# Patient Record
Sex: FEMALE | Race: ASIAN | NOT HISPANIC OR LATINO
[De-identification: names, ages, dates, MRNs, and addresses within clinical notes are randomized per-mention and may not be internally consistent; named-entity substitution may affect disease eponyms.]

---

## 2017-02-08 ENCOUNTER — APPOINTMENT (OUTPATIENT)
Dept: PULMONOLOGY | Facility: CLINIC | Age: 82
End: 2017-02-08

## 2017-02-08 VITALS
RESPIRATION RATE: 17 BRPM | HEART RATE: 60 BPM | DIASTOLIC BLOOD PRESSURE: 80 MMHG | WEIGHT: 160 LBS | HEIGHT: 60 IN | BODY MASS INDEX: 31.41 KG/M2 | OXYGEN SATURATION: 97 % | SYSTOLIC BLOOD PRESSURE: 120 MMHG

## 2017-02-08 RX ORDER — DICLOFENAC SODIUM AND MISOPROSTOL 50; 200 MG/1; UG/1
50-0.2 TABLET, DELAYED RELEASE ORAL
Qty: 60 | Refills: 2 | Status: ACTIVE | COMMUNITY
Start: 2017-02-08 | End: 1900-01-01

## 2017-05-26 ENCOUNTER — MEDICATION RENEWAL (OUTPATIENT)
Age: 82
End: 2017-05-26

## 2017-06-08 ENCOUNTER — APPOINTMENT (OUTPATIENT)
Dept: PULMONOLOGY | Facility: CLINIC | Age: 82
End: 2017-06-08

## 2017-06-08 VITALS
RESPIRATION RATE: 17 BRPM | SYSTOLIC BLOOD PRESSURE: 124 MMHG | HEART RATE: 57 BPM | HEIGHT: 60 IN | OXYGEN SATURATION: 100 % | BODY MASS INDEX: 31.41 KG/M2 | WEIGHT: 160 LBS | DIASTOLIC BLOOD PRESSURE: 80 MMHG

## 2017-10-09 ENCOUNTER — APPOINTMENT (OUTPATIENT)
Dept: PULMONOLOGY | Facility: CLINIC | Age: 82
End: 2017-10-09

## 2017-10-25 ENCOUNTER — RX RENEWAL (OUTPATIENT)
Age: 82
End: 2017-10-25

## 2017-11-01 ENCOUNTER — APPOINTMENT (OUTPATIENT)
Dept: PULMONOLOGY | Facility: CLINIC | Age: 82
End: 2017-11-01
Payer: MEDICARE

## 2017-11-01 VITALS
SYSTOLIC BLOOD PRESSURE: 126 MMHG | DIASTOLIC BLOOD PRESSURE: 80 MMHG | HEIGHT: 60 IN | WEIGHT: 160 LBS | HEART RATE: 64 BPM | BODY MASS INDEX: 31.41 KG/M2 | OXYGEN SATURATION: 97 %

## 2017-11-01 PROCEDURE — 94010 BREATHING CAPACITY TEST: CPT

## 2017-11-01 PROCEDURE — 94729 DIFFUSING CAPACITY: CPT

## 2017-11-01 PROCEDURE — 99214 OFFICE O/P EST MOD 30 MIN: CPT | Mod: 25

## 2018-04-30 ENCOUNTER — MEDICATION RENEWAL (OUTPATIENT)
Age: 83
End: 2018-04-30

## 2018-06-05 ENCOUNTER — APPOINTMENT (OUTPATIENT)
Dept: PULMONOLOGY | Facility: CLINIC | Age: 83
End: 2018-06-05

## 2018-07-17 ENCOUNTER — APPOINTMENT (OUTPATIENT)
Dept: PULMONOLOGY | Facility: CLINIC | Age: 83
End: 2018-07-17

## 2018-07-25 ENCOUNTER — NON-APPOINTMENT (OUTPATIENT)
Age: 83
End: 2018-07-25

## 2018-07-25 ENCOUNTER — APPOINTMENT (OUTPATIENT)
Dept: PULMONOLOGY | Facility: CLINIC | Age: 83
End: 2018-07-25
Payer: MEDICARE

## 2018-07-25 VITALS
WEIGHT: 134 LBS | OXYGEN SATURATION: 96 % | HEART RATE: 80 BPM | BODY MASS INDEX: 26.31 KG/M2 | HEIGHT: 60 IN | DIASTOLIC BLOOD PRESSURE: 80 MMHG | SYSTOLIC BLOOD PRESSURE: 126 MMHG

## 2018-07-25 PROCEDURE — 99214 OFFICE O/P EST MOD 30 MIN: CPT | Mod: 25

## 2018-07-25 PROCEDURE — 94010 BREATHING CAPACITY TEST: CPT

## 2018-10-26 ENCOUNTER — RX RENEWAL (OUTPATIENT)
Age: 83
End: 2018-10-26

## 2019-02-05 ENCOUNTER — APPOINTMENT (OUTPATIENT)
Dept: PULMONOLOGY | Facility: CLINIC | Age: 84
End: 2019-02-05
Payer: MEDICARE

## 2019-02-05 VITALS
SYSTOLIC BLOOD PRESSURE: 117 MMHG | OXYGEN SATURATION: 98 % | WEIGHT: 140 LBS | DIASTOLIC BLOOD PRESSURE: 80 MMHG | HEIGHT: 60 IN | RESPIRATION RATE: 17 BRPM | HEART RATE: 65 BPM | BODY MASS INDEX: 27.48 KG/M2

## 2019-02-05 PROCEDURE — 99214 OFFICE O/P EST MOD 30 MIN: CPT

## 2019-02-05 NOTE — HISTORY OF PRESENT ILLNESS
[FreeTextEntry1] : Ms. Trejo is a 89 year old female with a history of asthma, ILDz, PAH and SOB presenting to the office today for a follow up visit. Her chief complaint is knee pain and need for med renewals.\par \par The patient states that she is in her normal state of health and is here for renewal of her Adempas. \par \par She reports that she has been doing well and has been trying to walk more but knee pain/arthritis flares up.  She admits to poor sleep- she states she is up every couple of hours to urinate and cannot get back to sleep. She states this has been going on as she has gotten older. She is uninterested in testing for sleep apnea.\par \par She otherwise states that her appetite is a bit decreased and associates that with age, She reports weight has been stable, denies fever, chills, SOB at rest and with exertion, wheezing, sore throat, swallowing problems, post nasal drip, sinus pressure, itchy or watery eyes, ear pain, chest pain or pressure, palpitations, dizziness, PND, orthopnea or lower extremity edema. She is on lasix. She did not take it today.  She denies any nausea, vomiting or abdominal pain.  She does not have sour taste in the mouth, reflux or heartburn.  She is urinating normally, denies headaches, rashes or new muscle cramps.\par \par She has never went for a HRCT scan that was ordered and recommended by Dr. Osei re: ILD.

## 2019-02-05 NOTE — REVIEW OF SYSTEMS
[As Noted in HPI] : as noted in HPI [Difficulty Maintaining Sleep] : difficulty maintaining sleep [Nonrestorative Sleep] : nonrestorative sleep [Negative] : Pulmonary Hypertension [Difficulty Initiating Sleep] : no difficulty falling asleep

## 2019-02-05 NOTE — PHYSICAL EXAM
[General Appearance - Well Developed] : well developed [Normal Appearance] : normal appearance [Well Groomed] : well groomed [General Appearance - Well Nourished] : well nourished [No Deformities] : no deformities [General Appearance - In No Acute Distress] : no acute distress [Normal Conjunctiva] : the conjunctiva exhibited no abnormalities [Eyelids - No Xanthelasma] : the eyelids demonstrated no xanthelasmas [Normal Oropharynx] : normal oropharynx [III] : III [Neck Appearance] : the appearance of the neck was normal [Neck Cervical Mass (___cm)] : no neck mass was observed [Jugular Venous Distention Increased] : there was no jugular-venous distention [Thyroid Diffuse Enlargement] : the thyroid was not enlarged [Thyroid Nodule] : there were no palpable thyroid nodules [Heart Rate And Rhythm] : heart rate and rhythm were normal [Heart Sounds] : normal S1 and S2 [Murmurs] : no murmurs present [Respiration, Rhythm And Depth] : normal respiratory rhythm and effort [Exaggerated Use Of Accessory Muscles For Inspiration] : no accessory muscle use [Kyphosis] : kyphosis [Abdomen Soft] : soft [Abdomen Tenderness] : non-tender [Abdomen Mass (___ Cm)] : no abdominal mass palpated [Gait - Sufficient For Exercise Testing] : the gait was sufficient for exercise testing [Nail Clubbing] : no clubbing of the fingernails [Cyanosis, Localized] : no localized cyanosis [Petechial Hemorrhages (___cm)] : no petechial hemorrhages [Skin Color & Pigmentation] : normal skin color and pigmentation [] : no rash [No Venous Stasis] : no venous stasis [Skin Lesions] : no skin lesions [No Skin Ulcers] : no skin ulcer [No Xanthoma] : no  xanthoma was observed [Deep Tendon Reflexes (DTR)] : deep tendon reflexes were 2+ and symmetric [Sensation] : the sensory exam was normal to light touch and pinprick [No Focal Deficits] : no focal deficits [Oriented To Time, Place, And Person] : oriented to person, place, and time [Impaired Insight] : insight and judgment were intact [Affect] : the affect was normal [FreeTextEntry1] : Walks with a walker and has a limp

## 2019-02-05 NOTE — REASON FOR VISIT
[Follow-Up] : a follow-up visit [ILD] : ILD [Pulmonary Hypertension] : pulmonary hypertension [Other: _____] : [unfilled]

## 2019-02-05 NOTE — ASSESSMENT
[FreeTextEntry1] : The plan for the patient is as follows:\par \par problem 1: PAH\par -continue Adempas 0.5 mg TID- renewed 90 day supply with 1 refill ; tolerating it well\par -yearly echo' s needed with Dr. Majano, last was done in 2018 per pt's aide- will get a copy sent over\par -regular LFTs-pt reports she gets this with her PCP- will ask for records\par -advised to maintain a low salt diet \par \par problem 2: knee pain and arthritis \par -recommended Copper Knee brace / Arnica Cream / Topricin Cream \par -recommended orthopedic follow up \par \par problem 3: GERD-stable/denies symptoms\par -off zantac\par \par problem 4: ? ILDz\par -HRCT never completed, pt formally refuses to completing study as she reports feeling "well" and due to age. \par \par problem 5: asthma?- denies SOB or related symptoms\par -not on therapy\par -refused inhalers in the past\par \par \par F/U in 6 months with Dr. Osei or myself\par will encourage PFTs again next visit as well as 6MWT\par She is encouraged to call with any changes, concerns, or questions.

## 2019-02-08 ENCOUNTER — RX RENEWAL (OUTPATIENT)
Age: 84
End: 2019-02-08

## 2020-06-08 ENCOUNTER — APPOINTMENT (OUTPATIENT)
Dept: PULMONOLOGY | Facility: CLINIC | Age: 85
End: 2020-06-08
Payer: MEDICARE

## 2020-06-08 PROCEDURE — 99215 OFFICE O/P EST HI 40 MIN: CPT | Mod: 95

## 2020-06-08 NOTE — ADDENDUM
[FreeTextEntry1] : Documented by Jeff Bro acting as a scribe for Dr. Gordon Osei on 06/08/2020.\par \par All medical record entries made by the Scribe were at my, Dr. Gordon Osei's, direction and personally dictated by me on 06/08/2020. I have reviewed the chart and agree that the record accurately reflects my personal performance of the history, physical exam, assessment and plan. I have also personally directed, reviewed, and agree with the discharge instructions.

## 2020-06-08 NOTE — HISTORY OF PRESENT ILLNESS
[Home] : at home, [unfilled] , at the time of the visit. [Medical Office: ([facility])___] : at the medical office located in  [Verbal consent obtained from patient] : the patient, [unfilled] [FreeTextEntry1] : Ms. Trejo is a 89 year old female with a history of asthma, ILDz, lung field abnormal finding, PAH and SOB video calling to the office today for a follow up visit. Her chief complaint is\par \par -she notes she is generally feeling well\par -She notes Her bowels are regular\par -she notes energy levels are good 8.5/10\par -she notes sleeping issues\par -she notes frequent waking from sleep after a couple of hours\par -she notes sleeping 6 to 7 hours of sleep on average\par -she denies ankle swelling\par -she notes appetite is stable\par -she denies wheeze\par -she notes vision is stable\par -she notes no new medications\par \par -she denies any chest pain, chest pressure, diarrhea, constipation, dysphagia, dizziness, sour taste in the mouth, leg swelling, leg pain, itchy eyes, itchy ears, heartburn, reflux, myalgias or arthralgias.

## 2020-06-08 NOTE — ASSESSMENT
[FreeTextEntry1] : Ms. Trejo is a 89 year old female with a history of Macular Degeneration, PAH, arthritis, gerd, HTN, asthma who is stable from a pulmonary perspective. She video calls into the office for a pulmonary follow up. -Stable\par \par Her shortness of breath is multifactorial due to:\par -overweight\par -PAH\par -poor breathing mechanics related to balance\par -? 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 Majano, needs repeat if not done from 3/2016\par -tolerating it well\par -regular LFTs \par -to maintain a low salt diet \par \par -Disorder of the pulmonary arteries, important to distinguish the difference between pulmonary arterial hypertension which is idiopathic to secondary pulmonary hypertension which is related to heart disease being diastolic dysfunction or congestive heart failure. Diagnostics include an initial echocardiogram evaluating the pulmonary artery pressures, if this is abnormal, to proceed with a VQ scan as well as a CTPA and an eventual right heart catheterization (No medication can be prescribed until the right heart catheterization). If present, the evaluation will include rheumatological blood testing, HIV testing, and potential evaluation for cirrhosis. Drug classes include PED5 (Revatio, Adcirca) ETRA (Tracleer, Macitentan, Letairis), Soluble guanylate cyclase (Adempas) Prostacyclins (Uptravi, Tyavso, Ventavis, Remodulin, or Orenitram derivatives).\par \par problem 2: knee pain and arthritis \par -recommended Copper Knee brace / Arnica Cream / Topricin Cream \par -recommended orthopedic follow up \par -recommended to follow up with Dr. Luis Julien\par \par problem 3: GERD\par -continue to use Zantac 300 mg before bed\par -Rule of 2s: avoid eating too much, eating too late, eating too spicy, eating two hours before bed\par -Things to avoid including overeating, spicy foods, tight clothing, eating within three hours of bed, this list is not all inclusive. \par -For treatment of reflux, possible options discussed including diet control, H2 blockers, PPIs, as well as coating motility agents discussed as treatment options. Timing of meals and proximity of last meal to sleep were discussed. If symptoms persist, a formal gastrointestinal evaluation is needed. \par \par problem 4: ? ILDz\par -she is being sent for a high resolution chest CT with inspiratory and expiratory phases, noncompliant prior (prescription given)( multiple times)\par \par -Etiology will depend on the causative agent possibilities include: idiopathic pulmonary fibrosis (UIP), NSIP (nonspecific interstitial pneumonia) , respiratory bronchiolitis in someone who is a smoker, drug induced lung disease, hypersensitivity pneumonitis, BOOP, sarcoidosis, chronic congestive heart failure,  rheumatologic disorder induced interstitial lung disease. Optimal diagnosing will include rheumatological panel which would include ESR, FARNAZ, ANCA, ARNP, CCP, rheumatoid factor, hypersensitivity panel, JOE1, scleroderma antibodies, sjogren's syndrome antibodies; biopsy will be necessary to definitively determine the etiology unless the CT scan findings are specific for UIP. Therapy will be based on diagnostics. \par \par problem 5: asthma\par -she was offered a bronchodilator but at this point in time the patient has refused\par \par -Asthma is  believed to be caused by inherited (genetic) and environmental factor, but its exact cause is unknown. Asthma may be triggered by allergens, lung infections, or irritants in the air. Asthma triggers are different for each person\par \par problem 6: Health Maintenance/COVID19 Precautions:\par - Clean your hands often. Wash your hands often with soap and water for at least 20 seconds, especially after blowing your nose, coughing, or sneezing, or having been in a public place.\par - If soap and water are not available, use a hand  that contains at least 60% alcohol.\par - To the extent possible, avoid touching high-touch surfaces in public places - elevator buttons, door handles, handrails, handshaking with people, etc. Use a tissue or your sleeve to cover your hand or finger if you must touch something.\par - Wash your hands after touching surfaces in public places.\par - Avoid touching your face, nose, eyes, etc.\par - Clean and disinfect your home to remove germs: practice routine cleaning of frequently touched surfaces (for example: tables, doorknobs, light switches, handles, desks, toilets, faucets, sinks & cell phones)\par - Avoid crowds, especially in poorly ventilated spaces. Your risk of exposure to respiratory viruses like COVID-19 may increase in crowded, closed-in settings with little air circulation if there are people in the crowd who are sick. All patients are recommended to practice social distancing and stay at least 6 feet away from others.\par - Avoid all non-essential travel including plane trips, and especially avoid embarking on cruise ships.\par -If COVID-19 is spreading in your community, take extra measures to put distance between yourself and other people to further reduce your risk of being exposed to this new virus.\par -Stay home as much as possible.\par - Consider ways of getting food brought to your house through family, social, or commercial networks\par -Be aware that the virus has been known to live in the air up to 3 hours post exposure, cardboard up to 24 hours post exposure, copper up to 4 hours post exposure, steel and plastic up to 2-3 days post exposure. Risk of transmission from these surfaces are affected by many variables.\par Immune Support Recommendations:\par -OTC Vitamin C 500mg BID \par -OTC Quercetin 250-500mg BID \par -OTC Zinc 75-100mg per day \par -OTC Melatonin 1 or 2 mg a night \par -OTC Vitamin D 1-4000mg per day \par -OTC Tonic Water 8oz per day\par Asthma and COVID19:\par You need to make sure your asthma is under control. This often requires the use of inhaled corticosteroids (and sometimes oral corticosteroids). Inhaled corticosteroids do not likely reduce your immune system’s ability to fight infections, but oral corticosteroids may. It is important to use the steps above to protect yourself to limit your exposure to any respiratory virus. \par \par problem 7: health maintenance \par -s/p flu shot\par -recommended strep pneumonia vaccines: Prevnar-13 vaccine, followed by Pneumo vaccine 23 one year following\par -recommended early intervention for URIs\par -recommended regular osteoporosis evaluations\par -recommended early dermatological evaluations\par -recommended after the age of 50 to the age of 70, colonoscopy every 5 years \par \par F/U in 4 months\par She is encouraged to call with any changes, concerns, or questions.

## 2020-06-08 NOTE — REASON FOR VISIT
[Follow-Up] : a follow-up visit [Family Member] : family member [FreeTextEntry1] : video call- asthma, ILDz, lung field abnormal finding, PAH and SOB

## 2020-06-08 NOTE — PHYSICAL EXAM
[No Acute Distress] : no acute distress [Well Nourished] : well nourished [Well Developed] : well developed [No Deformities] : no deformities

## 2020-07-20 ENCOUNTER — INPATIENT (INPATIENT)
Facility: HOSPITAL | Age: 85
LOS: 4 days | Discharge: HOME HEALTH | End: 2020-07-25
Attending: INTERNAL MEDICINE | Admitting: INTERNAL MEDICINE
Payer: MEDICARE

## 2020-07-20 VITALS
WEIGHT: 134.92 LBS | HEIGHT: 64 IN | HEART RATE: 99 BPM | OXYGEN SATURATION: 96 % | TEMPERATURE: 96 F | SYSTOLIC BLOOD PRESSURE: 114 MMHG | DIASTOLIC BLOOD PRESSURE: 76 MMHG | RESPIRATION RATE: 21 BRPM

## 2020-07-20 DIAGNOSIS — Z98.49 CATARACT EXTRACTION STATUS, UNSPECIFIED EYE: Chronic | ICD-10-CM

## 2020-07-20 LAB
ALBUMIN SERPL ELPH-MCNC: 2.9 G/DL — LOW (ref 3.3–5)
ALP SERPL-CCNC: 70 U/L — SIGNIFICANT CHANGE UP (ref 40–120)
ALT FLD-CCNC: 23 U/L — SIGNIFICANT CHANGE UP (ref 12–78)
ANION GAP SERPL CALC-SCNC: 8 MMOL/L — SIGNIFICANT CHANGE UP (ref 5–17)
APPEARANCE UR: CLEAR — SIGNIFICANT CHANGE UP
AST SERPL-CCNC: 34 U/L — SIGNIFICANT CHANGE UP (ref 15–37)
BACTERIA # UR AUTO: ABNORMAL
BASOPHILS # BLD AUTO: 0.02 K/UL — SIGNIFICANT CHANGE UP (ref 0–0.2)
BASOPHILS NFR BLD AUTO: 0.2 % — SIGNIFICANT CHANGE UP (ref 0–2)
BILIRUB SERPL-MCNC: 0.5 MG/DL — SIGNIFICANT CHANGE UP (ref 0.2–1.2)
BILIRUB UR-MCNC: NEGATIVE — SIGNIFICANT CHANGE UP
BUN SERPL-MCNC: 20 MG/DL — SIGNIFICANT CHANGE UP (ref 7–23)
CALCIUM SERPL-MCNC: 8.8 MG/DL — SIGNIFICANT CHANGE UP (ref 8.5–10.1)
CHLORIDE SERPL-SCNC: 98 MMOL/L — SIGNIFICANT CHANGE UP (ref 96–108)
CO2 SERPL-SCNC: 25 MMOL/L — SIGNIFICANT CHANGE UP (ref 22–31)
COLOR SPEC: YELLOW — SIGNIFICANT CHANGE UP
CREAT SERPL-MCNC: 0.77 MG/DL — SIGNIFICANT CHANGE UP (ref 0.5–1.3)
DIFF PNL FLD: ABNORMAL
EOSINOPHIL # BLD AUTO: 0.04 K/UL — SIGNIFICANT CHANGE UP (ref 0–0.5)
EOSINOPHIL NFR BLD AUTO: 0.5 % — SIGNIFICANT CHANGE UP (ref 0–6)
EPI CELLS # UR: ABNORMAL
GLUCOSE SERPL-MCNC: 104 MG/DL — HIGH (ref 70–99)
GLUCOSE UR QL: NEGATIVE MG/DL — SIGNIFICANT CHANGE UP
HCT VFR BLD CALC: 34 % — LOW (ref 34.5–45)
HGB BLD-MCNC: 11.5 G/DL — SIGNIFICANT CHANGE UP (ref 11.5–15.5)
HYALINE CASTS # UR AUTO: ABNORMAL /LPF
IMM GRANULOCYTES NFR BLD AUTO: 0.5 % — SIGNIFICANT CHANGE UP (ref 0–1.5)
KETONES UR-MCNC: NEGATIVE — SIGNIFICANT CHANGE UP
LEUKOCYTE ESTERASE UR-ACNC: ABNORMAL
LYMPHOCYTES # BLD AUTO: 1.59 K/UL — SIGNIFICANT CHANGE UP (ref 1–3.3)
LYMPHOCYTES # BLD AUTO: 19.9 % — SIGNIFICANT CHANGE UP (ref 13–44)
MCHC RBC-ENTMCNC: 31.5 PG — SIGNIFICANT CHANGE UP (ref 27–34)
MCHC RBC-ENTMCNC: 33.8 GM/DL — SIGNIFICANT CHANGE UP (ref 32–36)
MCV RBC AUTO: 93.2 FL — SIGNIFICANT CHANGE UP (ref 80–100)
MONOCYTES # BLD AUTO: 0.62 K/UL — SIGNIFICANT CHANGE UP (ref 0–0.9)
MONOCYTES NFR BLD AUTO: 7.7 % — SIGNIFICANT CHANGE UP (ref 2–14)
NEUTROPHILS # BLD AUTO: 5.7 K/UL — SIGNIFICANT CHANGE UP (ref 1.8–7.4)
NEUTROPHILS NFR BLD AUTO: 71.2 % — SIGNIFICANT CHANGE UP (ref 43–77)
NITRITE UR-MCNC: NEGATIVE — SIGNIFICANT CHANGE UP
NRBC # BLD: 0 /100 WBCS — SIGNIFICANT CHANGE UP (ref 0–0)
PH UR: 6 — SIGNIFICANT CHANGE UP (ref 5–8)
PLATELET # BLD AUTO: 320 K/UL — SIGNIFICANT CHANGE UP (ref 150–400)
POTASSIUM SERPL-MCNC: 3.8 MMOL/L — SIGNIFICANT CHANGE UP (ref 3.5–5.3)
POTASSIUM SERPL-SCNC: 3.8 MMOL/L — SIGNIFICANT CHANGE UP (ref 3.5–5.3)
PROT SERPL-MCNC: 8.3 GM/DL — SIGNIFICANT CHANGE UP (ref 6–8.3)
PROT UR-MCNC: NEGATIVE MG/DL — SIGNIFICANT CHANGE UP
RBC # BLD: 3.65 M/UL — LOW (ref 3.8–5.2)
RBC # FLD: 12.7 % — SIGNIFICANT CHANGE UP (ref 10.3–14.5)
RBC CASTS # UR COMP ASSIST: ABNORMAL /HPF (ref 0–4)
SODIUM SERPL-SCNC: 131 MMOL/L — LOW (ref 135–145)
SP GR SPEC: 1.01 — SIGNIFICANT CHANGE UP (ref 1.01–1.02)
UROBILINOGEN FLD QL: NEGATIVE MG/DL — SIGNIFICANT CHANGE UP
WBC # BLD: 8.01 K/UL — SIGNIFICANT CHANGE UP (ref 3.8–10.5)
WBC # FLD AUTO: 8.01 K/UL — SIGNIFICANT CHANGE UP (ref 3.8–10.5)
WBC UR QL: ABNORMAL

## 2020-07-20 PROCEDURE — 99285 EMERGENCY DEPT VISIT HI MDM: CPT | Mod: CS

## 2020-07-20 PROCEDURE — 72148 MRI LUMBAR SPINE W/O DYE: CPT | Mod: 26

## 2020-07-20 PROCEDURE — 72100 X-RAY EXAM L-S SPINE 2/3 VWS: CPT | Mod: 26

## 2020-07-20 PROCEDURE — 99223 1ST HOSP IP/OBS HIGH 75: CPT

## 2020-07-20 RX ORDER — DEXAMETHASONE 0.5 MG/5ML
8 ELIXIR ORAL EVERY 12 HOURS
Refills: 0 | Status: DISCONTINUED | OUTPATIENT
Start: 2020-07-20 | End: 2020-07-20

## 2020-07-20 RX ORDER — MORPHINE SULFATE 50 MG/1
4 CAPSULE, EXTENDED RELEASE ORAL ONCE
Refills: 0 | Status: DISCONTINUED | OUTPATIENT
Start: 2020-07-20 | End: 2020-07-20

## 2020-07-20 RX ADMIN — MORPHINE SULFATE 4 MILLIGRAM(S): 50 CAPSULE, EXTENDED RELEASE ORAL at 18:52

## 2020-07-20 NOTE — ED ADULT TRIAGE NOTE - CHIEF COMPLAINT QUOTE
hx of multiple lumber herniated disk C/O lower back pain worsen 3 days ago causing impaired gait, states Gabapentin 100mg not effective. Denies dysuria, fever , chills. LBM last week. daughter Basilia Trejo [phone] present. hx of multiple lumber herniated disk C/O lower back pain worsen 3 days ago causing impaired gait, states Gabapentin 100mg not effective. Denies dysuria, fever , chills. LBM last week. Daughter Basilia Trejo cell [phone]

## 2020-07-20 NOTE — ED ADULT NURSE NOTE - OBJECTIVE STATEMENT
Pt c/o of lower back pain x days. Denies any injury. MRI performed last week that shows herniated disc. reported daughter

## 2020-07-20 NOTE — ED PROVIDER NOTE - CLINICAL SUMMARY MEDICAL DECISION MAKING FREE TEXT BOX
Ddx: Concern for cauda equina given bladder and bowel incontinence/ sciatica  Plan: Cbc, cmp, pain control, MRI LS, reassess Ddx: Concern for cauda equina given bladder and bowel incontinence/ sciatica  Plan: Cbc, cmp, pain control, MRI LS, reassess    Dichter: MRI w/ acute L3 compression fx. Body of report w/ note made of cauda equina compression. D/w Ortho, favor to be chronic; however will eval pt in ED. D/w pt and w/ her 2 daughters, agree w/ plan to admit for pain control, Ortho eval. Pt will be admitted to medicine.

## 2020-07-20 NOTE — ED PROVIDER NOTE - PROGRESS NOTE DETAILS
Spoke w/ Ortho, think cauda equina in MRI report chronic, but will see pt in ED, req xray imaging lumbo sacral spine. Spoke w/ Ortho, think cauda equina in MRI report chronic, but will see pt in ED, request xray imaging lumbo sacral spine.

## 2020-07-20 NOTE — CONSULT NOTE ADULT - SUBJECTIVE AND OBJECTIVE BOX
Patient is a 89y Female who presents c/o lumbar back pain. pt was seen outpt and recommended by PCP to come in for eval due to increasing back pain for the past few weeks. she is brought in with her daughters. pt reports long standing hx of lumbar back pan. she uses walker at baseline to ambulate. describes symptoms of neurogenic claudication: pain with ambulation and mild weakness worse with long distances of ambulation that improve with sitting and rest. she denies any trauma or falls. Denies pain/injury elsewhere. Denies numbness/tingling/paresthesias. Denies bowel/bladder incontinence. Denies fevers/chills. No other complaints at this time.    ros: all other ros neg other than noted above    Home Medications:  amLODIPine 5 mg oral tablet: 1 tab(s) orally once a day (2016 09:38)  furosemide 40 mg oral tablet: 1 tab(s) orally once a day (2016 09:38)  levothyroxine 88 mcg (0.088 mg) oral tablet: 1 tab(s) orally once a day (2016 09:38)  lisinopril 40 mg oral tablet: 1 tab(s) orally once a day (2016 09:38)  Metoprolol Succinate  mg oral tablet, extended release: 1 tab(s) orally once a day (2016 09:38)      Allergies    No Known Allergies    Intolerances      PAST MEDICAL & SURGICAL HISTORY:  Swelling of right lower extremity: chronic right lower extremity swelling as per daughter  Blindness of right eye  Aortic sclerosis  Pulmonary hypertension  Cataract  Hypothyroidism  HTN (hypertension)  History of cataract surgery                            11.5   8.01  )-----------( 320      ( 2020 18:59 )             34.0       2020 18:59    131    |  98     |  20     ----------------------------<  104    3.8     |  25     |  0.77     Ca    8.8        2020 18:59    TPro  8.3    /  Alb  2.9    /  TBili  0.5    /  DBili  x      /  AST  34     /  ALT  23     /  AlkPhos  70     2020 18:59        Urinalysis Basic - ( 2020 20:10 )    Color: Yellow / Appearance: Clear / S.010 / pH: x  Gluc: x / Ketone: Negative  / Bili: Negative / Urobili: Negative mg/dL   Blood: x / Protein: Negative mg/dL / Nitrite: Negative   Leuk Esterase: Trace / RBC: 6-10 /HPF / WBC 6-10   Sq Epi: x / Non Sq Epi: Moderate / Bacteria: Few        Vital Signs Last 24 Hrs  T(C): 35.8 (20 @ 17:14), Max: 35.8 (20 @ 17:14)  T(F): 96.4 (20 @ 17:14), Max: 96.4 (20 @ 17:14)  HR: 99 (20 @ 17:14) (99 - 99)  BP: 114/76 (20 @ 17:14) (114/76 - 114/76)  BP(mean): --  RR: 21 (20 @ 17:14) (21 - 21)  SpO2: 96% (20 @ 17:14) (96% - 96%)    Gen: NAD      Spine PE:  Skin intact  No gross deformity  +ttp lumbar spine midline  No midnline TTP C/T/S spine  No bony step offs  No paraspinal muscle ttp/hypertonicity   Negative clonus  Negative babinski  Negative pena  pulses intact  no calf ttp bilateral  neg SLR bilaterally    Motor:            ElbowFlex    WristExt   ElbowExt   FingerFlex   FingerAbd     R            5/5                5/5            5/5             5/5               5/5  L            5/5                5/5            5/5             5/5               5/5                    C5                C6              C7               C8           T1   R            5/5                5/5            5/5             5/5               5/5  L             5/5                5/5            5/5             5/5               5/5          HipFlex   HipExt   KneeFlex   KneeExt   AnkleDorsi   AnklePlantar   HaluxDorsi   HaluxPlantar  R        5/5        5/5            5/5            4+/5             5/5                 5/5                    5/5                5/5  L         5/5        5/5            5/5            4+/5             5/5                 5/5                    5/5                5/5                L2             L3             L4               L5            S1  R         5/5                5/5            5/5             5/5               5/5  L           5/5                5/5            5/5             5/5               5/5    Sensory:            C5         C6         C7      C8       T1        (0=absent, 1=impaired, 2=normal, NT=not testable)  R         2            2           2        2          2  L          2            2           2        2          2               L2          L3         L4      L5       S1         (0=absent, 1=impaired, 2=normal, NT=not testable)  R       2            2           2        2          2  L         2            2           2        2          2      Imaging: MRI LSpx:      FINDINGS:     ALIGNMENT: Straightening of the expected lumbar lordosis with minimal   retrolisthesis of L3 on L4 and grade 1 anterolisthesis of L5 on S1. There is   a moderate levoconvex curvature to the lumbar spine.     VERTEBRAE: There is acute compression fracture of the L3 superior endplate   with associated bone marrow edema and loss of approximately to 30% vertebral   body height anteriorly. There is no evidence of posterior retropulsion or   associated canal stenosis.     Otherwise, the visualized vertebral bodies are normal in height.     There is no evidence of aggressive osseous lesion. Please note, there is   diffuse heterogeneity of the bone marrow, findings favored to represent bony   demineralization.     DISCS: Toe degenerative loss of intervertebral disc space height.     CONUS MEDULLARIS AND CAUDA EQUINA: The conus medullaris terminates at L1-2.   There is normal appearance of the conus medullaris and cauda equina nerve   roots.     PARAVERTEBRAL SOFT TISSUES AND VISUALIZED RETROPERITONEUM: There is advanced   atrophy of the posterior paravertebral and paraspinal musculature, likely   related to disuse.     EVALUATION OF INDIVIDUAL LEVELS:   L1-2: Degenerative changes of the facet joints resulting in mild right and   mild-to-moderate left neuroforaminal narrowing. No canal stenosis.     L2-3: Disc bulge and degenerative changes of the facet joints, thickening of   the ligamentum flavum resulting in mild canal stenosis and severe right,   moderate to severe left neuroforaminal narrowing.     L3-4: Minimal retrolisthesis of L3 on L4. Disc bulge and change changes of   the facet joints, thickening of ligamentum flavum resulting in moderate to   severe canal stenosis with mass effect on the descending cauda equina nerve   roots (series 5, image 10). There is also severe right, moderate to severe   left neuroforaminal narrowing.     L4-5: Bulge and degenerative changes of the facet joints, thickening of the   ligamentum flavum resulting in moderate canal stenosis and severe   neuroforaminal narrowing bilaterally, left greater than right.     L5-S1: Minimal grade 1 anterolisthesis of L5 on S1. Minimal disc bulge and   degenerative changes of the facet joints, thickening of ligamentum flavum   resulting in mild canal stenosis and moderate left, mild to moderate right   neuroforaminal narrowing.     LIMITED EVALUATION OF UPPER SACRUM AND SACROILIAC JOINTS: Mild to moderate   degenerative changes of the sacroiliac joints.     IMPRESSION:     Acute compression fracture L3 superior endplate resulting in loss of   approximately 30% vertebral body height anteriorly, without retropulsion or   canal stenosis.       XR LSPx pending    A/P: 89y Female with Lumbar stenosis and acute L3 vcf    based on history and exam pt has lumbar stenosis with signs of neurogenic claudication and with evidence of L3 vcf  low suspicion for cauda equina syndrome  recommend conservative mgmt at this time  discussed with pt possible need for surgical intervention in the future if worsening symptoms and failed course of conservative mgmt  medical admit for pain control and PT jasmyn recinos appreciated  LSO for pain mgmt  iv steroids for inflammation  Pain control  WBAT with assistive devices as needed  FU LSpx  imaging reviewed  SCDs and ambulation for dvt ppx  will discuss with attending  and advise if plan changes Patient is a 89y Female who presents c/o lumbar back pain. pt was seen outpt and recommended by PCP to come in for eval due to increasing back pain for the past few weeks. she is brought in with her daughters. pt reports long standing hx of lumbar back pan. she uses walker at baseline to ambulate. describes symptoms of neurogenic claudication: pain with ambulation and mild weakness worse with long distances of ambulation that improve with sitting and rest. she denies any trauma or falls. Denies pain/injury elsewhere. Denies numbness/tingling/paresthesias. Denies bowel/bladder incontinence. Denies fevers/chills. No other complaints at this time.    ros: all other ros neg other than noted above    Home Medications:  amLODIPine 5 mg oral tablet: 1 tab(s) orally once a day (2016 09:38)  furosemide 40 mg oral tablet: 1 tab(s) orally once a day (2016 09:38)  levothyroxine 88 mcg (0.088 mg) oral tablet: 1 tab(s) orally once a day (2016 09:38)  lisinopril 40 mg oral tablet: 1 tab(s) orally once a day (2016 09:38)  Metoprolol Succinate  mg oral tablet, extended release: 1 tab(s) orally once a day (2016 09:38)      Allergies    No Known Allergies    Intolerances      PAST MEDICAL & SURGICAL HISTORY:  Swelling of right lower extremity: chronic right lower extremity swelling as per daughter  Blindness of right eye  Aortic sclerosis  Pulmonary hypertension  Cataract  Hypothyroidism  HTN (hypertension)  History of cataract surgery                            11.5   8.01  )-----------( 320      ( 2020 18:59 )             34.0       2020 18:59    131    |  98     |  20     ----------------------------<  104    3.8     |  25     |  0.77     Ca    8.8        2020 18:59    TPro  8.3    /  Alb  2.9    /  TBili  0.5    /  DBili  x      /  AST  34     /  ALT  23     /  AlkPhos  70     2020 18:59        Urinalysis Basic - ( 2020 20:10 )    Color: Yellow / Appearance: Clear / S.010 / pH: x  Gluc: x / Ketone: Negative  / Bili: Negative / Urobili: Negative mg/dL   Blood: x / Protein: Negative mg/dL / Nitrite: Negative   Leuk Esterase: Trace / RBC: 6-10 /HPF / WBC 6-10   Sq Epi: x / Non Sq Epi: Moderate / Bacteria: Few        Vital Signs Last 24 Hrs  T(C): 35.8 (20 @ 17:14), Max: 35.8 (20 @ 17:14)  T(F): 96.4 (20 @ 17:14), Max: 96.4 (20 @ 17:14)  HR: 99 (20 @ 17:14) (99 - 99)  BP: 114/76 (20 @ 17:14) (114/76 - 114/76)  BP(mean): --  RR: 21 (20 @ 17:14) (21 - 21)  SpO2: 96% (20 @ 17:14) (96% - 96%)    Gen: NAD      Spine PE:  Skin intact  No gross deformity  +ttp lumbar spine midline  No midnline TTP C/T/S spine  No bony step offs  No paraspinal muscle ttp/hypertonicity   Negative clonus  Negative babinski  Negative pena  pulses intact  no calf ttp bilateral  neg SLR bilaterally    Motor:            ElbowFlex    WristExt   ElbowExt   FingerFlex   FingerAbd     R            5/5                5/5            5/5             5/5               5/5  L            5/5                5/5            5/5             5/5               5/5                    C5                C6              C7               C8           T1   R            5/5                5/5            5/5             5/5               5/5  L             5/5                5/5            5/5             5/5               5/5          HipFlex   HipExt   KneeFlex   KneeExt   AnkleDorsi   AnklePlantar   HaluxDorsi   HaluxPlantar  R        5/5        5/5            5/5            4+/5             5/5                 5/5                    5/5                5/5  L         5/5        5/5            5/5            4+/5             5/5                 5/5                    5/5                5/5                L2             L3             L4               L5            S1  R         5/5                5/5            5/5             5/5               5/5  L           5/5                5/5            5/5             5/5               5/5    Sensory:            C5         C6         C7      C8       T1        (0=absent, 1=impaired, 2=normal, NT=not testable)  R         2            2           2        2          2  L          2            2           2        2          2               L2          L3         L4      L5       S1         (0=absent, 1=impaired, 2=normal, NT=not testable)  R       2            2           2        2          2  L         2            2           2        2          2      Imaging: MRI LSpx:      FINDINGS:     ALIGNMENT: Straightening of the expected lumbar lordosis with minimal   retrolisthesis of L3 on L4 and grade 1 anterolisthesis of L5 on S1. There is   a moderate levoconvex curvature to the lumbar spine.     VERTEBRAE: There is acute compression fracture of the L3 superior endplate   with associated bone marrow edema and loss of approximately to 30% vertebral   body height anteriorly. There is no evidence of posterior retropulsion or   associated canal stenosis.     Otherwise, the visualized vertebral bodies are normal in height.     There is no evidence of aggressive osseous lesion. Please note, there is   diffuse heterogeneity of the bone marrow, findings favored to represent bony   demineralization.     DISCS: Toe degenerative loss of intervertebral disc space height.     CONUS MEDULLARIS AND CAUDA EQUINA: The conus medullaris terminates at L1-2.   There is normal appearance of the conus medullaris and cauda equina nerve   roots.     PARAVERTEBRAL SOFT TISSUES AND VISUALIZED RETROPERITONEUM: There is advanced   atrophy of the posterior paravertebral and paraspinal musculature, likely   related to disuse.     EVALUATION OF INDIVIDUAL LEVELS:   L1-2: Degenerative changes of the facet joints resulting in mild right and   mild-to-moderate left neuroforaminal narrowing. No canal stenosis.     L2-3: Disc bulge and degenerative changes of the facet joints, thickening of   the ligamentum flavum resulting in mild canal stenosis and severe right,   moderate to severe left neuroforaminal narrowing.     L3-4: Minimal retrolisthesis of L3 on L4. Disc bulge and change changes of   the facet joints, thickening of ligamentum flavum resulting in moderate to   severe canal stenosis with mass effect on the descending cauda equina nerve   roots (series 5, image 10). There is also severe right, moderate to severe   left neuroforaminal narrowing.     L4-5: Bulge and degenerative changes of the facet joints, thickening of the   ligamentum flavum resulting in moderate canal stenosis and severe   neuroforaminal narrowing bilaterally, left greater than right.     L5-S1: Minimal grade 1 anterolisthesis of L5 on S1. Minimal disc bulge and   degenerative changes of the facet joints, thickening of ligamentum flavum   resulting in mild canal stenosis and moderate left, mild to moderate right   neuroforaminal narrowing.     LIMITED EVALUATION OF UPPER SACRUM AND SACROILIAC JOINTS: Mild to moderate   degenerative changes of the sacroiliac joints.     IMPRESSION:     Acute compression fracture L3 superior endplate resulting in loss of   approximately 30% vertebral body height anteriorly, without retropulsion or   canal stenosis.       XR LSPx pending    A/P: 89y Female with Lumbar stenosis and acute L3 vcf    based on history and exam pt has lumbar stenosis with signs of neurogenic claudication and with evidence of L3 vcf  low suspicion for cauda equina syndrome  recommend conservative mgmt at this time  discussed with pt possible need for surgical intervention in the future if worsening symptoms and failed course of conservative mgmt  medical admit for pain control and PT jasmyn recinos appreciated  LSO for pain mgmt  Pain control  WBAT with assistive devices as needed  FU LSpx  imaging reviewed  SCDs and ambulation for dvt ppx  will discuss with attending  and advise if plan changes

## 2020-07-20 NOTE — ED ADULT NURSE NOTE - CHIEF COMPLAINT QUOTE
hx of multiple lumber herniated disk C/O lower back pain worsen 3 days ago causing impaired gait, states Gabapentin 100mg not effective. Denies dysuria, fever , chills. LBM last week. Daughter Basilia Trejo cell [phone]

## 2020-07-20 NOTE — CONSULT NOTE ADULT - SUBJECTIVE AND OBJECTIVE BOX
REASON FOR CONSULT: telehospitalist evaluation    Outpatient physicians: Dr. Deepak Herman         HPI:   89 y.o. F with hx of blind in the R eye, [place], arthritis, chronic RLE swelling, HTN, hypothyroidism, lumbar disc herniation, baseline ambulates with a walker, currently presents to the ED with worsening lower back pain. The pain began about 2 weeks ago and progressively worsened in the last 5 days. Pt had an outpt MRI this past wed, showing herniated discs. She was started on gabapentin and continued with using tylenol prn and was given referred to see orthopedic surgery. Pt continued to have pain so she came to the ER. Pt reports the pain starts at the waist and radiates bilaterally down both legs and to the lower back.  Denies numbness/ tingling/urinary/ bowel incontinence. Difficult time getting out of bed and inability to ambulate. At baseline the pt ambulates with a walker. Patient denies chest pain, SOB, abd pain, N/V, fever, chills, dysuria or any other complaints. All remainder ROS negative. Minimal history obtained from the patient, daughter: Basilia provided history via phone.     PMH:   Aortic sclerosis    Blindness of right eye    Cataract    HTN (hypertension)    Hypothyroidism    Pulmonary hypertension    Swelling of right lower extremity  chronic right lower extremity swelling   Arthritis     PSH:   History of cataract surgery    Social Hx:  Denies smoking, alcohol or drug use  Ambulatory at baseline with walker     Family Hx:   None       Meds:   lisinopril 40 mg oral tablet: 1 tab(s) orally once a day  levothyroxine 88 mcg (0.088 mg) oral tablet: 1 tab(s) orally once a day  furosemide 40 mg oral tablet: 1 tab(s) orally once a day  Metoprolol Succinate  mg oral tablet, extended release: 1 tab(s) orally once a day  Gbaapentin 100mg po tid     Med rec completed     Allergies: NKDA       OBJECTIVE  Vital Signs Last 24 Hrs  T(C): 35.8 (2020 17:14), Max: 35.8 (2020 17:14)  T(F): 96.4 (2020 17:14), Max: 96.4 (2020 17:14)  HR: 99 (2020 17:14) (99 - 99)  BP: 114/76 (2020 17:14) (114/76 - 114/76)  BP(mean): --  RR: 21 (2020 17:14) (21 - 21)  SpO2: 96% (2020 17:14) (96% - 96%)      PHYSICAL EXAM: Tele-evaluation precludes physical exam.       LABS AND IMAGING DATA:                        11.5   8.01  )-----------( 320      ( 2020 18:59 )             34.0     07-20    131<L>  |  98  |  20  ----------------------------<  104<H>  3.8   |  25  |  0.77    Ca    8.8      2020 18:59    TPro  8.3  /  Alb  2.9<L>  /  TBili  0.5  /  DBili  x   /  AST  34  /  ALT  23  /  AlkPhos  70  07-20    Urinalysis Basic - ( 2020 20:10 )    Color: Yellow / Appearance: Clear / S.010 / pH: x  Gluc: x / Ketone: Negative  / Bili: Negative / Urobili: Negative mg/dL   Blood: x / Protein: Negative mg/dL / Nitrite: Negative   Leuk Esterase: Trace / RBC: 6-10 /HPF / WBC 6-10   Sq Epi: x / Non Sq Epi: Moderate / Bacteria: Few      ASSESSMENT AND PLAN:     89 y.o. F with hx of HTN, hypothyroidism, lumbar disc herniation, baseline ambulates with a walker, currently presents to the ED with intractable lower back pain with associated urinary incontinence and inability to ambulate, noted on imaging with lumbar stenosis and L3 vertebral compression fracture, low suspicion for cauda equina syndrome.    Admit to any medicine bed     Intractable back pain 2/2 lumbar stenosis and L3 vertebral compression fracture, low suspicion for cauda equina syndrome  - inability to ambulate due to pain   -MRI lumbar spine reviewed   -f/u lumbar/sacral spine x ray   -c/w LSO brace per ortho   -will c/w decadron 4mg IV q8hrs x 24-48hrs until official recommendations by ortho given   -c/w pain control   -c/w tylenol 975mg po q8hrs x 48 hrs routine then can make it prn   -c/w tramadol 25mg po q8hrs for moderate pain   -c/w tramadol 50mg po q8hrs for severe pain   -c/w gabapentin po tid   -WBAT with assistive devices as needed  -ortho consult noted and appreciated     HTN   -bp stable  -c/w lisinopril 40 mg po qd   -c/w furosemide 40 mg po qd   -c/w Metoprolol Succinate  mg po qd     Hypothyroidism   -c/w levothyroxine 88 mcg po qd     DVT ppx  -c/w lovenox 40mg sq qd     Activity level: Ambulate with assist/walker     Next of kin: Daughter - Basilia - called and discussed with her in regards to the history/ current plan of care. Updated in regards to the care and all questions answered.     Dispo: PT consulted, pending pain control and PT evaluation.         Care plan discussed with Dr. Beauchamp

## 2020-07-20 NOTE — ED ADULT NURSE NOTE - PMH
Aortic sclerosis    Blindness of right eye    Cataract    HTN (hypertension)    Hypothyroidism    Pulmonary hypertension    Swelling of right lower extremity  chronic right lower extremity swelling as per daughter

## 2020-07-20 NOTE — ED PROVIDER NOTE - OBJECTIVE STATEMENT
Pt is a 89 yo lady with a pmhx of HTN, hypothyroidism, lumbar disc herniation who presents to the ED with low back pain. It started 2 wks ago, worse over the past 5 days. Had MRI Wednesday, showing herniated discs. Pt was started on Gabapentin, and referred to surgery. Pt still having pain so came to ED. Pt has pain at waist and radiates bilaterally down legs. No numbness or tingling, but has had urinary incontinence, as well as no bm for 2 wks per family. Pt says sometimes is constipated.

## 2020-07-21 DIAGNOSIS — M54.42 LUMBAGO WITH SCIATICA, LEFT SIDE: ICD-10-CM

## 2020-07-21 DIAGNOSIS — I10 ESSENTIAL (PRIMARY) HYPERTENSION: ICD-10-CM

## 2020-07-21 DIAGNOSIS — N39.0 URINARY TRACT INFECTION, SITE NOT SPECIFIED: ICD-10-CM

## 2020-07-21 DIAGNOSIS — E03.9 HYPOTHYROIDISM, UNSPECIFIED: ICD-10-CM

## 2020-07-21 LAB
ANION GAP SERPL CALC-SCNC: 5 MMOL/L — SIGNIFICANT CHANGE UP (ref 5–17)
BASOPHILS # BLD AUTO: 0.02 K/UL — SIGNIFICANT CHANGE UP (ref 0–0.2)
BASOPHILS NFR BLD AUTO: 0.3 % — SIGNIFICANT CHANGE UP (ref 0–2)
BUN SERPL-MCNC: 14 MG/DL — SIGNIFICANT CHANGE UP (ref 7–23)
CALCIUM SERPL-MCNC: 8.8 MG/DL — SIGNIFICANT CHANGE UP (ref 8.5–10.1)
CHLORIDE SERPL-SCNC: 98 MMOL/L — SIGNIFICANT CHANGE UP (ref 96–108)
CO2 SERPL-SCNC: 30 MMOL/L — SIGNIFICANT CHANGE UP (ref 22–31)
CREAT SERPL-MCNC: 0.65 MG/DL — SIGNIFICANT CHANGE UP (ref 0.5–1.3)
EOSINOPHIL # BLD AUTO: 0.09 K/UL — SIGNIFICANT CHANGE UP (ref 0–0.5)
EOSINOPHIL NFR BLD AUTO: 1.3 % — SIGNIFICANT CHANGE UP (ref 0–6)
GLUCOSE SERPL-MCNC: 105 MG/DL — HIGH (ref 70–99)
HCT VFR BLD CALC: 35 % — SIGNIFICANT CHANGE UP (ref 34.5–45)
HGB BLD-MCNC: 11.5 G/DL — SIGNIFICANT CHANGE UP (ref 11.5–15.5)
IMM GRANULOCYTES NFR BLD AUTO: 0.3 % — SIGNIFICANT CHANGE UP (ref 0–1.5)
LYMPHOCYTES # BLD AUTO: 1.44 K/UL — SIGNIFICANT CHANGE UP (ref 1–3.3)
LYMPHOCYTES # BLD AUTO: 20.1 % — SIGNIFICANT CHANGE UP (ref 13–44)
MCHC RBC-ENTMCNC: 31.3 PG — SIGNIFICANT CHANGE UP (ref 27–34)
MCHC RBC-ENTMCNC: 32.9 GM/DL — SIGNIFICANT CHANGE UP (ref 32–36)
MCV RBC AUTO: 95.4 FL — SIGNIFICANT CHANGE UP (ref 80–100)
MONOCYTES # BLD AUTO: 0.61 K/UL — SIGNIFICANT CHANGE UP (ref 0–0.9)
MONOCYTES NFR BLD AUTO: 8.5 % — SIGNIFICANT CHANGE UP (ref 2–14)
NEUTROPHILS # BLD AUTO: 5 K/UL — SIGNIFICANT CHANGE UP (ref 1.8–7.4)
NEUTROPHILS NFR BLD AUTO: 69.5 % — SIGNIFICANT CHANGE UP (ref 43–77)
NRBC # BLD: 0 /100 WBCS — SIGNIFICANT CHANGE UP (ref 0–0)
PLATELET # BLD AUTO: 351 K/UL — SIGNIFICANT CHANGE UP (ref 150–400)
POTASSIUM SERPL-MCNC: 3.4 MMOL/L — LOW (ref 3.5–5.3)
POTASSIUM SERPL-SCNC: 3.4 MMOL/L — LOW (ref 3.5–5.3)
RBC # BLD: 3.67 M/UL — LOW (ref 3.8–5.2)
RBC # FLD: 12.7 % — SIGNIFICANT CHANGE UP (ref 10.3–14.5)
SARS-COV-2 IGG SERPL QL IA: NEGATIVE — SIGNIFICANT CHANGE UP
SARS-COV-2 IGM SERPL IA-ACNC: <3.8 AU/ML — SIGNIFICANT CHANGE UP
SARS-COV-2 RNA SPEC QL NAA+PROBE: SIGNIFICANT CHANGE UP
SODIUM SERPL-SCNC: 133 MMOL/L — LOW (ref 135–145)
WBC # BLD: 7.18 K/UL — SIGNIFICANT CHANGE UP (ref 3.8–10.5)
WBC # FLD AUTO: 7.18 K/UL — SIGNIFICANT CHANGE UP (ref 3.8–10.5)

## 2020-07-21 PROCEDURE — 12345: CPT | Mod: NC

## 2020-07-21 RX ORDER — LISINOPRIL 2.5 MG/1
40 TABLET ORAL DAILY
Refills: 0 | Status: DISCONTINUED | OUTPATIENT
Start: 2020-07-21 | End: 2020-07-25

## 2020-07-21 RX ORDER — SENNA PLUS 8.6 MG/1
2 TABLET ORAL AT BEDTIME
Refills: 0 | Status: DISCONTINUED | OUTPATIENT
Start: 2020-07-21 | End: 2020-07-25

## 2020-07-21 RX ORDER — FUROSEMIDE 40 MG
40 TABLET ORAL DAILY
Refills: 0 | Status: DISCONTINUED | OUTPATIENT
Start: 2020-07-21 | End: 2020-07-25

## 2020-07-21 RX ORDER — LEVOTHYROXINE SODIUM 125 MCG
88 TABLET ORAL DAILY
Refills: 0 | Status: DISCONTINUED | OUTPATIENT
Start: 2020-07-21 | End: 2020-07-25

## 2020-07-21 RX ORDER — DEXAMETHASONE 0.5 MG/5ML
4 ELIXIR ORAL EVERY 8 HOURS
Refills: 0 | Status: DISCONTINUED | OUTPATIENT
Start: 2020-07-21 | End: 2020-07-21

## 2020-07-21 RX ORDER — ACETAMINOPHEN 500 MG
975 TABLET ORAL EVERY 8 HOURS
Refills: 0 | Status: COMPLETED | OUTPATIENT
Start: 2020-07-21 | End: 2020-07-23

## 2020-07-21 RX ORDER — CEFTRIAXONE 500 MG/1
1000 INJECTION, POWDER, FOR SOLUTION INTRAMUSCULAR; INTRAVENOUS EVERY 24 HOURS
Refills: 0 | Status: DISCONTINUED | OUTPATIENT
Start: 2020-07-22 | End: 2020-07-22

## 2020-07-21 RX ORDER — CEFTRIAXONE 500 MG/1
INJECTION, POWDER, FOR SOLUTION INTRAMUSCULAR; INTRAVENOUS
Refills: 0 | Status: DISCONTINUED | OUTPATIENT
Start: 2020-07-21 | End: 2020-07-22

## 2020-07-21 RX ORDER — LACTULOSE 10 G/15ML
10 SOLUTION ORAL EVERY 12 HOURS
Refills: 0 | Status: DISCONTINUED | OUTPATIENT
Start: 2020-07-21 | End: 2020-07-25

## 2020-07-21 RX ORDER — ENOXAPARIN SODIUM 100 MG/ML
40 INJECTION SUBCUTANEOUS DAILY
Refills: 0 | Status: DISCONTINUED | OUTPATIENT
Start: 2020-07-21 | End: 2020-07-25

## 2020-07-21 RX ORDER — METOPROLOL TARTRATE 50 MG
200 TABLET ORAL DAILY
Refills: 0 | Status: DISCONTINUED | OUTPATIENT
Start: 2020-07-21 | End: 2020-07-25

## 2020-07-21 RX ORDER — CEFTRIAXONE 500 MG/1
1000 INJECTION, POWDER, FOR SOLUTION INTRAMUSCULAR; INTRAVENOUS ONCE
Refills: 0 | Status: COMPLETED | OUTPATIENT
Start: 2020-07-21 | End: 2020-07-21

## 2020-07-21 RX ORDER — GABAPENTIN 400 MG/1
100 CAPSULE ORAL THREE TIMES A DAY
Refills: 0 | Status: DISCONTINUED | OUTPATIENT
Start: 2020-07-21 | End: 2020-07-25

## 2020-07-21 RX ORDER — TRAMADOL HYDROCHLORIDE 50 MG/1
50 TABLET ORAL EVERY 8 HOURS
Refills: 0 | Status: DISCONTINUED | OUTPATIENT
Start: 2020-07-21 | End: 2020-07-22

## 2020-07-21 RX ORDER — POTASSIUM CHLORIDE 20 MEQ
40 PACKET (EA) ORAL ONCE
Refills: 0 | Status: COMPLETED | OUTPATIENT
Start: 2020-07-21 | End: 2020-07-21

## 2020-07-21 RX ORDER — TRAMADOL HYDROCHLORIDE 50 MG/1
25 TABLET ORAL EVERY 8 HOURS
Refills: 0 | Status: DISCONTINUED | OUTPATIENT
Start: 2020-07-21 | End: 2020-07-25

## 2020-07-21 RX ADMIN — Medication 4 MILLIGRAM(S): at 05:24

## 2020-07-21 RX ADMIN — Medication 88 MICROGRAM(S): at 05:24

## 2020-07-21 RX ADMIN — GABAPENTIN 100 MILLIGRAM(S): 400 CAPSULE ORAL at 05:24

## 2020-07-21 RX ADMIN — Medication 40 MILLIEQUIVALENT(S): at 09:58

## 2020-07-21 RX ADMIN — Medication 975 MILLIGRAM(S): at 02:39

## 2020-07-21 RX ADMIN — Medication 975 MILLIGRAM(S): at 01:24

## 2020-07-21 RX ADMIN — GABAPENTIN 100 MILLIGRAM(S): 400 CAPSULE ORAL at 21:17

## 2020-07-21 RX ADMIN — Medication 200 MILLIGRAM(S): at 05:24

## 2020-07-21 RX ADMIN — CEFTRIAXONE 100 MILLIGRAM(S): 500 INJECTION, POWDER, FOR SOLUTION INTRAMUSCULAR; INTRAVENOUS at 02:02

## 2020-07-21 RX ADMIN — ENOXAPARIN SODIUM 40 MILLIGRAM(S): 100 INJECTION SUBCUTANEOUS at 11:49

## 2020-07-21 RX ADMIN — LISINOPRIL 40 MILLIGRAM(S): 2.5 TABLET ORAL at 05:24

## 2020-07-21 RX ADMIN — Medication 975 MILLIGRAM(S): at 17:06

## 2020-07-21 RX ADMIN — Medication 40 MILLIGRAM(S): at 05:24

## 2020-07-21 RX ADMIN — LACTULOSE 10 GRAM(S): 10 SOLUTION ORAL at 17:52

## 2020-07-21 RX ADMIN — GABAPENTIN 100 MILLIGRAM(S): 400 CAPSULE ORAL at 13:08

## 2020-07-21 RX ADMIN — Medication 975 MILLIGRAM(S): at 18:00

## 2020-07-21 RX ADMIN — SENNA PLUS 2 TABLET(S): 8.6 TABLET ORAL at 21:17

## 2020-07-21 NOTE — H&P ADULT - NSHPLABSRESULTS_GEN_ALL_CORE
LABS:                        11.5   8.01  )-----------( 320      ( 2020 18:59 )             34.0     07-20    131<L>  |  98  |  20  ----------------------------<  104<H>  3.8   |  25  |  0.77    Ca    8.8      2020 18:59    TPro  8.3  /  Alb  2.9<L>  /  TBili  0.5  /  DBili  x   /  AST  34  /  ALT  23  /  AlkPhos  70  07-20      Urinalysis Basic - ( 2020 20:10 )    Color: Yellow / Appearance: Clear / S.010 / pH: x  Gluc: x / Ketone: Negative  / Bili: Negative / Urobili: Negative mg/dL   Blood: x / Protein: Negative mg/dL / Nitrite: Negative   Leuk Esterase: Trace / RBC: 6-10 /HPF / WBC 6-10   Sq Epi: x / Non Sq Epi: Moderate / Bacteria: Few      CAPILLARY BLOOD GLUCOSE                RADIOLOGY & ADDITIONAL TESTS:  < from: MR Lumbar Spine No Cont (20 @ 19:35) >    IMPRESSION:     Acute compression fracture L3 superior endplate resulting in loss of approximately 30% vertebral body height anteriorly, without retropulsion or canal stenosis.    < end of copied text >      Imaging Personally Reviewed:  [x ] YES  [ ] NO    Consultant(s) Notes Reviewed:  [x ] YES  [ ] NO

## 2020-07-21 NOTE — H&P ADULT - NSHPPHYSICALEXAM_GEN_ALL_CORE
T(C): 37.1 (21 Jul 2020 00:36), Max: 37.1 (21 Jul 2020 00:36)  T(F): 98.7 (21 Jul 2020 00:36), Max: 98.7 (21 Jul 2020 00:36)  HR: 65 (21 Jul 2020 00:36) (65 - 99)  BP: 182/65 (21 Jul 2020 00:36) (114/76 - 182/65)  BP(mean): --  RR: 18 (21 Jul 2020 00:36) (18 - 21)  SpO2: 95% (21 Jul 2020 00:36) (95% - 96%)    PHYSICAL EXAM:  GENERAL: NAD, well-groomed, well-developed  HEAD:  Atraumatic, Normocephalic  EYES: EOMI, PERRLA, conjunctiva and sclera clear  ENMT: No tonsillar erythema, exudates, or enlargement; Moist mucous membranes,  No lesions  NECK: Supple, No JVD, Normal thyroid  NERVOUS SYSTEM:  Alert & Oriented X3, CN 2-12 intact, no focal deficits  CHEST/LUNG: Clear to percussion bilaterally; No rales, rhonchi, wheezing, or rubs  HEART: Regular rate and rhythm; No murmurs, rubs, or gallops  ABDOMEN: Soft, Nontender, Nondistended; Bowel sounds present  EXTREMITIES:  + Peripheral Pulses, No clubbing, cyanosis, or edema  LYMPH: No lymphadenopathy noted  SKIN: No rashes or lesions

## 2020-07-21 NOTE — CHART NOTE - NSCHARTNOTEFT_GEN_A_CORE
Patient was seen and examined.   L3 compression fracture and DJD L spine.   cont current pain management.   PT  consulted ortho and follow recs.   replete Hypokalemia. Recheck level..

## 2020-07-21 NOTE — H&P ADULT - NSICDXPASTMEDICALHX_GEN_ALL_CORE_FT
PAST MEDICAL HISTORY:  Aortic sclerosis     Blindness of right eye     Cataract     HTN (hypertension)     Hypothyroidism     Pulmonary hypertension     Swelling of right lower extremity chronic right lower extremity swelling as per daughter

## 2020-07-21 NOTE — H&P ADULT - HISTORY OF PRESENT ILLNESS
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 The pain began about 2 weeks ago and progressively worsened in the last 5 days. Pt had an outpt MRI this past wed, showing herniated discs. She was started on gabapentin and continued with using tylenol prn and was given referred to see orthopedic surgery. Pt continued to have pain so she came to the ER. Pt reports the pain starts at the waist and radiates bilaterally down both legs and to the lower back.  Denies numbness/ tingling/urinary/ bowel incontinence. Difficult time getting out of bed and inability to ambulate. At baseline the pt ambulates with a walker. Patient denies chest pain, SOB, abd pain, N/V, fever, chills, dysuria or any other complaints. All remainder ROS negative. Minimal history obtained from the patient, daughter: Basilia provided history via phone.

## 2020-07-21 NOTE — H&P ADULT - ASSESSMENT
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 The pain began about 2 weeks ago and progressively worsened in the last 5 days. Pt had an outpt MRI this past wed, showing herniated discs. She was started on gabapentin and continued with using tylenol prn and was given referred to see orthopedic surgery. Pt continued to have pain so she came to the ER. Pt reports the pain starts at the waist and radiates bilaterally down both legs and to the lower back.  Denies numbness/ tingling/urinary/ bowel incontinence. Difficult time getting out of bed and inability to ambulate. At baseline the pt ambulates with a walker. Patient denies chest pain, SOB, abd pain, N/V, fever, chills, dysuria or any other complaints. All remainder ROS negative. Minimal history obtained from the patient, daughter: Basilia provided history via phone. 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 The pain began about 2 weeks ago and progressively worsened in the last 5 days. Pt had an outpt MRI this past wed, showing herniated discs. She was started on gabapentin and continued with using tylenol prn and was given referred to see orthopedic surgery. Pt continued to have pain so she came to the ER. Pt reports the pain starts at the waist and radiates bilaterally down both legs and to the lower back.  Denies numbness/ tingling/urinary/ bowel incontinence. Difficult time getting out of bed and inability to ambulate. At baseline the pt ambulates with a walker. Patient denies chest pain, SOB, abd pain, N/V, fever, chills, dysuria or any other complaints. All remainder ROS negative. Minimal history obtained from the patient, daughter: Basilia provided history via phone.   Pt presents with intractable lower back pain with associated urinary incontinence and inability to ambulate, noted on imaging with lumbar stenosis and L3 vertebral compression fracture, low suspicion for cauda equina syndrome. Pt has UTI.    IMPROVE VTE Individual Risk Assessment          RISK                                                          Points    [  ] Previous VTE                                                3    [  ] Thrombophilia                                             2    [  ] Lower limb paralysis                                    2        (unable to hold up >15 seconds)      [  ] Current Cancer                                             2         (within 6 months)    [x  ] Immobilization > 24 hrs                              1    [  ] ICU/CCU stay > 24 hours                            1    [ x ] Age > 60                                                    1    IMPROVE VTE Score ___2______

## 2020-07-21 NOTE — CHART NOTE - NSCHARTNOTEFT_GEN_A_CORE
Chart reviewed and noted came to [facility] with daughters, sent in by PCP due to worsened LBP. Ortho consulted, MRI obtained: Acute compression fracture L3 superior endplate. LSO for pain management. Per DENISE Waters, came up from ED without LSO. Spoke to patient by bedside who stated she had a brace but she does not wear it. Belongings inspected c permission -- no LSO. PT held off at this time until brace available. Advised Ortho team pager at [phone] of same.

## 2020-07-21 NOTE — H&P ADULT - NSHPREVIEWOFSYSTEMS_GEN_ALL_CORE
REVIEW OF SYSTEMS:  CONSTITUTIONAL: No fever, weight loss, or fatigue  EYES: No eye pain, blind R eye, no discharge  ENMT:  No difficulty hearing, tinnitus, vertigo; No sinus or throat pain  NECK: No pain or stiffness  BREASTS: No pain, masses, or nipple discharge  RESPIRATORY: No cough, wheezing, chills or hemoptysis; No shortness of breath  CARDIOVASCULAR: No chest pain, palpitations, dizziness, or leg swelling  GASTROINTESTINAL: No abdominal or epigastric pain. No nausea, vomiting, or hematemesis; No diarrhea or constipation. No melena or hematochezia.  GENITOURINARY: No dysuria, frequency, hematuria, or incontinence  NEUROLOGICAL: No headaches, memory loss, loss of strength, numbness, or tremors  SKIN: No itching, burning, rashes, or lesions   LYMPH NODES: No enlarged glands  ENDOCRINE: No heat or cold intolerance; No hair loss  MUSCULOSKELETAL: + back  pain  PSYCHIATRIC: No depression, anxiety, mood swings, or difficulty sleeping  HEME/LYMPH: No easy bruising, or bleeding gums  ALLERGY AND IMMUNOLOGIC: No hives or eczema

## 2020-07-22 LAB
24R-OH-CALCIDIOL SERPL-MCNC: 17.3 NG/ML — LOW (ref 30–80)
ANION GAP SERPL CALC-SCNC: 8 MMOL/L — SIGNIFICANT CHANGE UP (ref 5–17)
BUN SERPL-MCNC: 16 MG/DL — SIGNIFICANT CHANGE UP (ref 7–23)
CALCIUM SERPL-MCNC: 9 MG/DL — SIGNIFICANT CHANGE UP (ref 8.5–10.1)
CHLORIDE SERPL-SCNC: 101 MMOL/L — SIGNIFICANT CHANGE UP (ref 96–108)
CO2 SERPL-SCNC: 28 MMOL/L — SIGNIFICANT CHANGE UP (ref 22–31)
CREAT SERPL-MCNC: 0.75 MG/DL — SIGNIFICANT CHANGE UP (ref 0.5–1.3)
CULTURE RESULTS: SIGNIFICANT CHANGE UP
GLUCOSE SERPL-MCNC: 102 MG/DL — HIGH (ref 70–99)
MAGNESIUM SERPL-MCNC: 2.2 MG/DL — SIGNIFICANT CHANGE UP (ref 1.6–2.6)
PHOSPHATE SERPL-MCNC: 4 MG/DL — SIGNIFICANT CHANGE UP (ref 2.5–4.5)
POTASSIUM SERPL-MCNC: 3.4 MMOL/L — LOW (ref 3.5–5.3)
POTASSIUM SERPL-SCNC: 3.4 MMOL/L — LOW (ref 3.5–5.3)
SODIUM SERPL-SCNC: 137 MMOL/L — SIGNIFICANT CHANGE UP (ref 135–145)
SPECIMEN SOURCE: SIGNIFICANT CHANGE UP
TSH SERPL-MCNC: 1.11 UU/ML — SIGNIFICANT CHANGE UP (ref 0.36–3.74)

## 2020-07-22 PROCEDURE — 99232 SBSQ HOSP IP/OBS MODERATE 35: CPT

## 2020-07-22 RX ORDER — POTASSIUM CHLORIDE 20 MEQ
40 PACKET (EA) ORAL EVERY 4 HOURS
Refills: 0 | Status: COMPLETED | OUTPATIENT
Start: 2020-07-22 | End: 2020-07-22

## 2020-07-22 RX ORDER — CHOLECALCIFEROL (VITAMIN D3) 125 MCG
1000 CAPSULE ORAL DAILY
Refills: 0 | Status: DISCONTINUED | OUTPATIENT
Start: 2020-07-22 | End: 2020-07-25

## 2020-07-22 RX ADMIN — GABAPENTIN 100 MILLIGRAM(S): 400 CAPSULE ORAL at 05:30

## 2020-07-22 RX ADMIN — TRAMADOL HYDROCHLORIDE 50 MILLIGRAM(S): 50 TABLET ORAL at 13:15

## 2020-07-22 RX ADMIN — GABAPENTIN 100 MILLIGRAM(S): 400 CAPSULE ORAL at 13:31

## 2020-07-22 RX ADMIN — Medication 40 MILLIEQUIVALENT(S): at 13:38

## 2020-07-22 RX ADMIN — TRAMADOL HYDROCHLORIDE 50 MILLIGRAM(S): 50 TABLET ORAL at 12:14

## 2020-07-22 RX ADMIN — GABAPENTIN 100 MILLIGRAM(S): 400 CAPSULE ORAL at 21:02

## 2020-07-22 RX ADMIN — Medication 975 MILLIGRAM(S): at 09:32

## 2020-07-22 RX ADMIN — LISINOPRIL 40 MILLIGRAM(S): 2.5 TABLET ORAL at 05:30

## 2020-07-22 RX ADMIN — Medication 40 MILLIEQUIVALENT(S): at 12:16

## 2020-07-22 RX ADMIN — Medication 200 MILLIGRAM(S): at 05:30

## 2020-07-22 RX ADMIN — Medication 975 MILLIGRAM(S): at 01:16

## 2020-07-22 RX ADMIN — CEFTRIAXONE 100 MILLIGRAM(S): 500 INJECTION, POWDER, FOR SOLUTION INTRAMUSCULAR; INTRAVENOUS at 01:16

## 2020-07-22 RX ADMIN — LACTULOSE 10 GRAM(S): 10 SOLUTION ORAL at 05:36

## 2020-07-22 RX ADMIN — Medication 975 MILLIGRAM(S): at 10:30

## 2020-07-22 RX ADMIN — Medication 40 MILLIGRAM(S): at 05:30

## 2020-07-22 RX ADMIN — SENNA PLUS 2 TABLET(S): 8.6 TABLET ORAL at 21:02

## 2020-07-22 RX ADMIN — Medication 88 MICROGRAM(S): at 05:30

## 2020-07-22 RX ADMIN — ENOXAPARIN SODIUM 40 MILLIGRAM(S): 100 INJECTION SUBCUTANEOUS at 12:14

## 2020-07-22 RX ADMIN — Medication 975 MILLIGRAM(S): at 00:04

## 2020-07-22 NOTE — PROGRESS NOTE ADULT - ASSESSMENT
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 The pain began about 2 weeks ago and progressively worsened in the last 5 days. Pt had an outpt MRI this past wed, showing herniated discs. She was started on gabapentin and continued with using tylenol prn and was given referred to see orthopedic surgery. Pt continued to have pain so she came to the ER. Pt reports the pain starts at the waist and radiates bilaterally down both legs and to the lower back.  Denies numbness/ tingling/urinary/ bowel incontinence. Difficult time getting out of bed and inability to ambulate. At baseline the pt ambulates with a walker. Patient denies chest pain, SOB, abd pain, N/V, fever, chills, dysuria or any other complaints. All remainder ROS negative. Minimal history obtained from the patient, daughter: Basilia provided history via phone.       Pt presents with intractable lower back pain with associated urinary incontinence and inability to ambulate, noted on imaging with lumbar stenosis and L3 vertebral compression fracture, low suspicion for cauda equina syndrome. Pt has UTI.    {[id number]}   Acute bilateral low back pain with bilateral sciatica.  Plan: intractable pain, unable to ambulate, pain control, gabapentin, LSO brace, seen by orthopedics, steroids for few days, but low suspicion cauda equina, PT.     {[id number]} Problem/Plan - 2:  ·  Problem: Hypothyroidism, unspecified type.  Plan: replacement.     {[id number]} Problem/Plan - 3:  ·  Problem: Urinary tract infection with hematuria, site unspecified.  Plan: urine culture, antibiotic.     {[id number]} Problem/Plan - 4:  ·  Problem: Essential hypertension.  Plan: continue current BP medications. 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 Recent outpt MRI showed herniated discs.    {[id number]}Chronic worsening low back pain with bilateral sciatica  - MRI showed an acute compression fracture L3 superior endplate resulting in loss of approximately 30% vertebral body height anteriorly, without retropulsion or canal stenosis  - as per ortho, patient should have LSO    - ortho recommended conservative management  - c/w gabapentin & PRN ultram  - PT eval once patient has LSO  {[id number]}  Hypothyroidism  - c/w synthroid    Pyuria  - UA showed mod epithelial cells and was likely contaminated   - doubt UTI, so will stop antibiotics     Essential hypertension  - c/w Toprol and lisinopril    Vitamin D deficiency  - Vit D, 25-OH level was 17.3  - start Vit D repletion    Hypokalemia  - replace w/ KCl    Constipation  - c/w Senna and lactulose     Prophylaxis:  DVT: Lovenox  GI: Po diet

## 2020-07-22 NOTE — PROGRESS NOTE ADULT - SUBJECTIVE AND OBJECTIVE BOX
Orthopedics      Patient seen and examined at bedside. Feeling much with pain controlled. She would like to go home today.     Vital Signs Last 24 Hrs  T(C): 36.7 (07-22-20 @ 05:10), Max: 36.8 (07-21-20 @ 16:22)  T(F): 98.1 (07-22-20 @ 05:10), Max: 98.2 (07-21-20 @ 16:22)  HR: 63 (07-22-20 @ 05:10) (62 - 88)  BP: 168/92 (07-22-20 @ 05:10) (142/64 - 168/92)  BP(mean): --  RR: 18 (07-22-20 @ 05:10) (18 - 20)  SpO2: 99% (07-22-20 @ 05:10) (96% - 99%)                        11.5   7.18  )-----------( 351      ( 21 Jul 2020 06:30 )             35.0     21 Jul 2020 06:30    133    |  98     |  14     ----------------------------<  105    3.4     |  30     |  0.65     Ca    8.8        21 Jul 2020 06:30    TPro  8.3    /  Alb  2.9    /  TBili  0.5    /  DBili  x      /  AST  34     /  ALT  23     /  AlkPhos  70     20 Jul 2020 18:59      Gen: NAD      Spine PE:  Skin intact  +ttp lumbar spine midline  Negative babinski  Negative pena  pulses intact  no calf ttp bilateral    Motor:            ElbowFlex    WristExt   ElbowExt   FingerFlex   FingerAbd     R            5/5                5/5            5/5             5/5               5/5  L            5/5                5/5            5/5             5/5               5/5                    C5                C6              C7               C8           T1   R            5/5                5/5            5/5             5/5               5/5  L             5/5                5/5            5/5             5/5               5/5          HipFlex   HipExt   KneeFlex   KneeExt   AnkleDorsi   AnklePlantar   HaluxDorsi   HaluxPlantar  R        5/5        5/5            5/5            4+/5             5/5                 5/5                    5/5                5/5  L         5/5        5/5            5/5            4+/5             5/5                 5/5                    5/5                5/5                L2             L3             L4               L5            S1  R         5/5                5/5            5/5             5/5               5/5  L           5/5                5/5            5/5             5/5               5/5    Sensory:            C5         C6         C7      C8       T1        (0=absent, 1=impaired, 2=normal, NT=not testable)  R         2            2           2        2          2  L          2            2           2        2          2               L2          L3         L4      L5       S1         (0=absent, 1=impaired, 2=normal, NT=not testable)  R       2            2           2        2          2  L         2            2           2        2          2      A/P: 89yF with VCF and severe DD of the lumbar spine  -FU AM labs  -Pain control  -WBAT LE   -DVT ppx  -Incentive Spirometry  -Elevation to extremity  -Medical management appreciated Orthopedics      Patient seen and examined at bedside. Feeling much with pain controlled. She would like to go home today.     Vital Signs Last 24 Hrs  T(C): 36.7 (07-22-20 @ 05:10), Max: 36.8 (07-21-20 @ 16:22)  T(F): 98.1 (07-22-20 @ 05:10), Max: 98.2 (07-21-20 @ 16:22)  HR: 63 (07-22-20 @ 05:10) (62 - 88)  BP: 168/92 (07-22-20 @ 05:10) (142/64 - 168/92)  BP(mean): --  RR: 18 (07-22-20 @ 05:10) (18 - 20)  SpO2: 99% (07-22-20 @ 05:10) (96% - 99%)                        11.5   7.18  )-----------( 351      ( 21 Jul 2020 06:30 )             35.0     21 Jul 2020 06:30    133    |  98     |  14     ----------------------------<  105    3.4     |  30     |  0.65     Ca    8.8        21 Jul 2020 06:30    TPro  8.3    /  Alb  2.9    /  TBili  0.5    /  DBili  x      /  AST  34     /  ALT  23     /  AlkPhos  70     20 Jul 2020 18:59      Gen: NAD      Spine PE:  Skin intact  +ttp lumbar spine midline  Negative babinski  Negative pena  pulses intact  no calf ttp bilateral    Motor:            ElbowFlex    WristExt   ElbowExt   FingerFlex   FingerAbd     R            5/5                5/5            5/5             5/5               5/5  L            5/5                5/5            5/5             5/5               5/5                    C5                C6              C7               C8           T1   R            5/5                5/5            5/5             5/5               5/5  L             5/5                5/5            5/5             5/5               5/5          HipFlex   HipExt   KneeFlex   KneeExt   AnkleDorsi   AnklePlantar   HaluxDorsi   HaluxPlantar  R        5/5        5/5            5/5            4+/5             5/5                 5/5                    5/5                5/5  L         5/5        5/5            5/5            4+/5             5/5                 5/5                    5/5                5/5                L2             L3             L4               L5            S1  R         5/5                5/5            5/5             5/5               5/5  L           5/5                5/5            5/5             5/5               5/5    Sensory:            C5         C6         C7      C8       T1        (0=absent, 1=impaired, 2=normal, NT=not testable)  R         2            2           2        2          2  L          2            2           2        2          2               L2          L3         L4      L5       S1         (0=absent, 1=impaired, 2=normal, NT=not testable)  R       2            2           2        2          2  L         2            2           2        2          2      A/P: 89yF with VCF and severe DD of the lumbar spine  -FU AM labs  -Pain control  -WBAT LE   -DVT ppx  -Incentive Spirometry  -Elevation to extremity  -Medical management appreciated  -No acute orthopaedic surgical intervention at this time, please re-consult as needed  -Ortho Stable for DC  -Case discussed with attending who agrees with above plan

## 2020-07-22 NOTE — CHART NOTE - NSCHARTNOTEFT_GEN_A_CORE
Discussed c DENISE Mata, whom communicated to Ortho team. Confirmed that patient should have LSO for mobility and LSO will be obtained by ortho team. No LSO at bedside at time of writing. Will continue to follow and intervene when LSO available.

## 2020-07-22 NOTE — PROGRESS NOTE ADULT - SUBJECTIVE AND OBJECTIVE BOX
Patient is a 89y old  Female who presents with a chief complaint of Back pain. (22 Jul 2020 06:18)      INTERVAL HPI/OVERNIGHT EVENTS:    MEDICATIONS  (STANDING):  acetaminophen   Tablet .. 975 milliGRAM(s) Oral every 8 hours  cefTRIAXone   IVPB      cefTRIAXone   IVPB 1000 milliGRAM(s) IV Intermittent every 24 hours  enoxaparin Injectable 40 milliGRAM(s) SubCutaneous daily  furosemide    Tablet 40 milliGRAM(s) Oral daily  gabapentin 100 milliGRAM(s) Oral three times a day  lactulose Syrup 10 Gram(s) Oral every 12 hours  levothyroxine 88 MICROGram(s) Oral daily  lisinopril 40 milliGRAM(s) Oral daily  metoprolol succinate  milliGRAM(s) Oral daily  senna 2 Tablet(s) Oral at bedtime    MEDICATIONS  (PRN):  traMADol 25 milliGRAM(s) Oral every 8 hours PRN Moderate Pain (4 - 6)  traMADol 50 milliGRAM(s) Oral every 8 hours PRN Severe Pain (7 - 10)      Allergies    No Known Allergies    Intolerances        Vital Signs Last 24 Hrs  T(C): 36.9 (22 Jul 2020 16:12), Max: 36.9 (22 Jul 2020 16:12)  T(F): 98.4 (22 Jul 2020 16:12), Max: 98.4 (22 Jul 2020 16:12)  HR: 74 (22 Jul 2020 16:12) (62 - 74)  BP: 140/76 (22 Jul 2020 16:12) (106/61 - 168/92)  BP(mean): --  RR: 18 (22 Jul 2020 16:12) (17 - 18)  SpO2: 94% (22 Jul 2020 16:12) (94% - 99%)    PHYSICAL EXAM:  GENERAL: NAD, well-groomed, well-developed  HEAD:  Atraumatic, Normocephalic  EYES: EOMI, PERRLA, conjunctiva and sclera clear  ENMT: No tonsillar erythema, exudates, or enlargement; Moist mucous membranes, Good dentition, No lesions  NECK: Supple, No JVD, Normal thyroid  NERVOUS SYSTEM:  Alert & Oriented X3, Good concentration; Motor Strength 5/5 B/L upper and lower extremities; DTRs 2+ intact and symmetric  CHEST/LUNG: Clear to auscultation bilaterally; No rales, rhonchi, wheezing, or rubs  HEART: Regular rate and rhythm; No murmurs, rubs, or gallops  ABDOMEN: Soft, Nontender, Nondistended; Bowel sounds present  EXTREMITIES:  2+ Peripheral Pulses, No clubbing, cyanosis, or edema  LYMPH: No lymphadenopathy noted  SKIN: No rashes or lesions    LABS:                        11.5   7.18  )-----------( 351      ( 21 Jul 2020 06:30 )             35.0     07-22    137  |  101  |  16  ----------------------------<  102<H>  3.4<L>   |  28  |  0.75    Ca    9.0      22 Jul 2020 07:19  Phos  4.0     07-22  Mg     2.2     07-22          CAPILLARY BLOOD GLUCOSE          Culture - Urine (collected 21 Jul 2020 14:14)  Source: .Urine Clean Catch (Midstream)  Final Report (22 Jul 2020 11:45):    <10,000 CFU/mL Normal Urogenital Annabella      RADIOLOGY & ADDITIONAL TESTS:    07-21-20 @ 07:01  -  07-22-20 @ 07:00  --------------------------------------------------------  IN:    IV PiggyBack: 150 mL    Oral Fluid: 450 mL  Total IN: 600 mL    OUT:    Voided: 400 mL  Total OUT: 400 mL    Total NET: 200 mL      07-22-20 @ 07:01  -  07-22-20 @ 21:34  --------------------------------------------------------  IN:    Oral Fluid: 700 mL  Total IN: 700 mL    OUT:  Total OUT: 0 mL    Total NET: 700 mL 89 y/o Female 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 Recent outpt MRI showed herniated discs. She is lying in bed in NAD.    MEDICATIONS  (STANDING):  acetaminophen   Tablet .. 975 milliGRAM(s) Oral every 8 hours  cefTRIAXone   IVPB      cefTRIAXone   IVPB 1000 milliGRAM(s) IV Intermittent every 24 hours  enoxaparin Injectable 40 milliGRAM(s) SubCutaneous daily  furosemide    Tablet 40 milliGRAM(s) Oral daily  gabapentin 100 milliGRAM(s) Oral three times a day  lactulose Syrup 10 Gram(s) Oral every 12 hours  levothyroxine 88 MICROGram(s) Oral daily  lisinopril 40 milliGRAM(s) Oral daily  metoprolol succinate  milliGRAM(s) Oral daily  senna 2 Tablet(s) Oral at bedtime    MEDICATIONS  (PRN):  traMADol 25 milliGRAM(s) Oral every 8 hours PRN Moderate Pain (4 - 6)  traMADol 50 milliGRAM(s) Oral every 8 hours PRN Severe Pain (7 - 10)      Allergies    No Known Allergies    Intolerances        Vital Signs Last 24 Hrs  T(C): 36.9 (22 Jul 2020 16:12), Max: 36.9 (22 Jul 2020 16:12)  T(F): 98.4 (22 Jul 2020 16:12), Max: 98.4 (22 Jul 2020 16:12)  HR: 74 (22 Jul 2020 16:12) (62 - 74)  BP: 140/76 (22 Jul 2020 16:12) (106/61 - 168/92)  BP(mean): --  RR: 18 (22 Jul 2020 16:12) (17 - 18)  SpO2: 94% (22 Jul 2020 16:12) (94% - 99%)    PHYSICAL EXAM:  GENERAL: NAD, well-groomed, well-developed  HEAD:  Atraumatic, Normocephalic  EYES: EOMI, PERRLA  NECK: Supple   NERVOUS SYSTEM: Alert   CHEST/LUNG: Clear to auscultation bilaterally; No rales, rhonchi, wheezing, or rubs  HEART: Regular rate and rhythm; No murmurs, rubs, or gallops  ABDOMEN: Soft, Nontender, Nondistended; Bowel sounds present  EXTREMITIES: No clubbing, cyanosis, or edema       LABS:                        11.5   7.18  )-----------( 351      ( 21 Jul 2020 06:30 )             35.0     07-22    137  |  101  |  16  ----------------------------<  102<H>  3.4<L>   |  28  |  0.75    Ca    9.0      22 Jul 2020 07:19  Phos  4.0     07-22  Mg     2.2     07-22          CAPILLARY BLOOD GLUCOSE          Culture - Urine (collected 21 Jul 2020 14:14)  Source: .Urine Clean Catch (Midstream)  Final Report (22 Jul 2020 11:45):    <10,000 CFU/mL Normal Urogenital Annabella      RADIOLOGY & ADDITIONAL TESTS:    07-21-20 @ 07:01  -  07-22-20 @ 07:00  --------------------------------------------------------  IN:    IV PiggyBack: 150 mL    Oral Fluid: 450 mL  Total IN: 600 mL    OUT:    Voided: 400 mL  Total OUT: 400 mL    Total NET: 200 mL      07-22-20 @ 07:01  -  07-22-20 @ 21:34  --------------------------------------------------------  IN:    Oral Fluid: 700 mL  Total IN: 700 mL    OUT:  Total OUT: 0 mL    Total NET: 700 mL

## 2020-07-23 LAB
ANION GAP SERPL CALC-SCNC: 6 MMOL/L — SIGNIFICANT CHANGE UP (ref 5–17)
BUN SERPL-MCNC: 18 MG/DL — SIGNIFICANT CHANGE UP (ref 7–23)
CALCIUM SERPL-MCNC: 9 MG/DL — SIGNIFICANT CHANGE UP (ref 8.5–10.1)
CHLORIDE SERPL-SCNC: 96 MMOL/L — SIGNIFICANT CHANGE UP (ref 96–108)
CO2 SERPL-SCNC: 31 MMOL/L — SIGNIFICANT CHANGE UP (ref 22–31)
CREAT SERPL-MCNC: 0.79 MG/DL — SIGNIFICANT CHANGE UP (ref 0.5–1.3)
GLUCOSE SERPL-MCNC: 99 MG/DL — SIGNIFICANT CHANGE UP (ref 70–99)
MAGNESIUM SERPL-MCNC: 2.6 MG/DL — SIGNIFICANT CHANGE UP (ref 1.6–2.6)
POTASSIUM SERPL-MCNC: 3.9 MMOL/L — SIGNIFICANT CHANGE UP (ref 3.5–5.3)
POTASSIUM SERPL-SCNC: 3.9 MMOL/L — SIGNIFICANT CHANGE UP (ref 3.5–5.3)
SODIUM SERPL-SCNC: 133 MMOL/L — LOW (ref 135–145)

## 2020-07-23 PROCEDURE — 99232 SBSQ HOSP IP/OBS MODERATE 35: CPT

## 2020-07-23 RX ORDER — SODIUM CHLORIDE 9 MG/ML
2 INJECTION INTRAMUSCULAR; INTRAVENOUS; SUBCUTANEOUS
Refills: 0 | Status: COMPLETED | OUTPATIENT
Start: 2020-07-23 | End: 2020-07-25

## 2020-07-23 RX ADMIN — Medication 200 MILLIGRAM(S): at 05:10

## 2020-07-23 RX ADMIN — SODIUM CHLORIDE 2 GRAM(S): 9 INJECTION INTRAMUSCULAR; INTRAVENOUS; SUBCUTANEOUS at 20:02

## 2020-07-23 RX ADMIN — LISINOPRIL 40 MILLIGRAM(S): 2.5 TABLET ORAL at 05:10

## 2020-07-23 RX ADMIN — SENNA PLUS 2 TABLET(S): 8.6 TABLET ORAL at 21:07

## 2020-07-23 RX ADMIN — Medication 1000 UNIT(S): at 11:54

## 2020-07-23 RX ADMIN — GABAPENTIN 100 MILLIGRAM(S): 400 CAPSULE ORAL at 05:14

## 2020-07-23 RX ADMIN — Medication 88 MICROGRAM(S): at 05:10

## 2020-07-23 RX ADMIN — Medication 40 MILLIGRAM(S): at 05:10

## 2020-07-23 RX ADMIN — GABAPENTIN 100 MILLIGRAM(S): 400 CAPSULE ORAL at 14:39

## 2020-07-23 RX ADMIN — Medication 975 MILLIGRAM(S): at 00:25

## 2020-07-23 RX ADMIN — GABAPENTIN 100 MILLIGRAM(S): 400 CAPSULE ORAL at 21:06

## 2020-07-23 RX ADMIN — ENOXAPARIN SODIUM 40 MILLIGRAM(S): 100 INJECTION SUBCUTANEOUS at 11:54

## 2020-07-23 NOTE — CHART NOTE - NSCHARTNOTEFT_GEN_A_CORE
D/w with Dr. Simpson, LSO as needed for comfort - may be obtained in office. Stable for DC from ortho standpoint    -No acute orthopaedic surgical intervention at this time, please re-consult as needed  -Ortho Stable for DC  -Case discussed with attending who agrees with above plan

## 2020-07-23 NOTE — PROGRESS NOTE ADULT - ASSESSMENT
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 Recent outpt MRI showed herniated discs.    {[id number]}Chronic worsening low back pain with bilateral sciatica  - MRI showed an acute compression fracture L3 superior endplate resulting in loss of approximately 30% vertebral body height anteriorly, without retropulsion or canal stenosis  - as per ortho, patient should have LSO for comfort  - ortho recommended conservative management  - c/w gabapentin & PRN ultram  - PT eval once patient has LSO  {[id number]}  Hypothyroidism  - c/w synthroid    Pyuria  - UA showed mod epithelial cells and was likely contaminated   - doubt UTI, so will stop antibiotics     Essential hypertension  - c/w Toprol and lisinopril    Vitamin D deficiency  - Vit D, 25-OH level was 17.3  - start Vit D repletion    Hypokalemia  - replace w/ KCl    Constipation  - c/w Senna and lactulose     Prophylaxis:  DVT: Lovenox  GI: Po diet    Discharge pending PT eval 89 y/o Female 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 Recent outpt MRI showed herniated discs.    {[id number]}Chronic worsening low back pain with bilateral sciatica  - MRI showed an acute compression fracture L3 superior endplate resulting in loss of approximately 30% vertebral body height anteriorly, without retropulsion or canal stenosis  - as per ortho, patient should have LSO for comfort  - ortho recommended conservative management  - c/w gabapentin & PRN ultram  - PT eval once patient has LSO  {[id number]}  Hypothyroidism  - c/w synthroid    Pyuria  - UA showed mod epithelial cells and was likely contaminated   - doubt UTI, so will stop antibiotics     Essential hypertension  - c/w Toprol and lisinopril    Vitamin D deficiency  - Vit D, 25-OH level was 17.3  - start Vit D repletion    Hypokalemia  - resolved    Constipation  - c/w Senna and lactulose     Prophylaxis:  DVT: Lovenox  GI: Po diet    Discharge pending PT eval

## 2020-07-23 NOTE — PROGRESS NOTE ADULT - SUBJECTIVE AND OBJECTIVE BOX
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 Recent outpt MRI showed herniated discs. She is lying in bed in NAD.     MEDICATIONS  (STANDING):  cholecalciferol 1000 Unit(s) Oral daily  enoxaparin Injectable 40 milliGRAM(s) SubCutaneous daily  furosemide    Tablet 40 milliGRAM(s) Oral daily  gabapentin 100 milliGRAM(s) Oral three times a day  lactulose Syrup 10 Gram(s) Oral every 12 hours  levothyroxine 88 MICROGram(s) Oral daily  lisinopril 40 milliGRAM(s) Oral daily  metoprolol succinate  milliGRAM(s) Oral daily  senna 2 Tablet(s) Oral at bedtime    MEDICATIONS  (PRN):  traMADol 25 milliGRAM(s) Oral every 8 hours PRN Moderate Pain (4 - 6)      Allergies    No Known Allergies    Intolerances        Vital Signs Last 24 Hrs  T(C): 36.9 (23 Jul 2020 16:05), Max: 37.2 (23 Jul 2020 00:25)  T(F): 98.4 (23 Jul 2020 16:05), Max: 98.9 (23 Jul 2020 00:25)  HR: 69 (23 Jul 2020 16:05) (50 - 69)  BP: 140/63 (23 Jul 2020 16:05) (114/67 - 145/60)  BP(mean): --  RR: 17 (23 Jul 2020 16:05) (17 - 18)  SpO2: 96% (23 Jul 2020 16:05) (96% - 99%)    PHYSICAL EXAM:  GENERAL: NAD, well-groomed, well-developed  HEAD:  Atraumatic, Normocephalic  EYES: EOMI, PERRLA  NECK: Supple   NERVOUS SYSTEM: Alert   CHEST/LUNG: Clear to auscultation bilaterally; No rales, rhonchi, wheezing, or rubs  HEART: Regular rate and rhythm; No murmurs, rubs, or gallops  ABDOMEN: Soft, Nontender, Nondistended; Bowel sounds present  EXTREMITIES: No clubbing, cyanosis, or edema      LABS:    07-23    133<L>  |  96  |  18  ----------------------------<  99  3.9   |  31  |  0.79    Ca    9.0      23 Jul 2020 07:06  Phos  4.0     07-22  Mg     2.6     07-23       Culture - Urine (collected 21 Jul 2020 14:14)  Source: .Urine Clean Catch (Midstream)  Final Report (22 Jul 2020 11:45):    <10,000 CFU/mL Normal Urogenital Annabella      RADIOLOGY & ADDITIONAL TESTS:    07-22-20 @ 07:01  -  07-23-20 @ 07:00  --------------------------------------------------------  IN:    Oral Fluid: 850 mL  Total IN: 850 mL    OUT:  Total OUT: 0 mL    Total NET: 850 mL      07-23-20 @ 07:01  -  07-23-20 @ 18:46  --------------------------------------------------------  IN:    Oral Fluid: 240 mL  Total IN: 240 mL    OUT:  Total OUT: 0 mL    Total NET: 240 mL

## 2020-07-24 ENCOUNTER — TRANSCRIPTION ENCOUNTER (OUTPATIENT)
Age: 85
End: 2020-07-24

## 2020-07-24 LAB
ANION GAP SERPL CALC-SCNC: 5 MMOL/L — SIGNIFICANT CHANGE UP (ref 5–17)
BUN SERPL-MCNC: 19 MG/DL — SIGNIFICANT CHANGE UP (ref 7–23)
CALCIUM SERPL-MCNC: 8.9 MG/DL — SIGNIFICANT CHANGE UP (ref 8.5–10.1)
CHLORIDE SERPL-SCNC: 97 MMOL/L — SIGNIFICANT CHANGE UP (ref 96–108)
CO2 SERPL-SCNC: 32 MMOL/L — HIGH (ref 22–31)
CREAT SERPL-MCNC: 0.77 MG/DL — SIGNIFICANT CHANGE UP (ref 0.5–1.3)
GLUCOSE SERPL-MCNC: 98 MG/DL — SIGNIFICANT CHANGE UP (ref 70–99)
MAGNESIUM SERPL-MCNC: 2.8 MG/DL — HIGH (ref 1.6–2.6)
POTASSIUM SERPL-MCNC: 3.3 MMOL/L — LOW (ref 3.5–5.3)
POTASSIUM SERPL-SCNC: 3.3 MMOL/L — LOW (ref 3.5–5.3)
SODIUM SERPL-SCNC: 134 MMOL/L — LOW (ref 135–145)

## 2020-07-24 PROCEDURE — 99232 SBSQ HOSP IP/OBS MODERATE 35: CPT

## 2020-07-24 RX ORDER — POTASSIUM CHLORIDE 20 MEQ
40 PACKET (EA) ORAL EVERY 4 HOURS
Refills: 0 | Status: COMPLETED | OUTPATIENT
Start: 2020-07-24 | End: 2020-07-24

## 2020-07-24 RX ADMIN — GABAPENTIN 100 MILLIGRAM(S): 400 CAPSULE ORAL at 22:00

## 2020-07-24 RX ADMIN — GABAPENTIN 100 MILLIGRAM(S): 400 CAPSULE ORAL at 05:43

## 2020-07-24 RX ADMIN — SENNA PLUS 2 TABLET(S): 8.6 TABLET ORAL at 22:00

## 2020-07-24 RX ADMIN — LACTULOSE 10 GRAM(S): 10 SOLUTION ORAL at 05:44

## 2020-07-24 RX ADMIN — Medication 40 MILLIGRAM(S): at 05:43

## 2020-07-24 RX ADMIN — Medication 200 MILLIGRAM(S): at 05:43

## 2020-07-24 RX ADMIN — Medication 40 MILLIEQUIVALENT(S): at 17:27

## 2020-07-24 RX ADMIN — SODIUM CHLORIDE 2 GRAM(S): 9 INJECTION INTRAMUSCULAR; INTRAVENOUS; SUBCUTANEOUS at 08:58

## 2020-07-24 RX ADMIN — SODIUM CHLORIDE 2 GRAM(S): 9 INJECTION INTRAMUSCULAR; INTRAVENOUS; SUBCUTANEOUS at 22:00

## 2020-07-24 RX ADMIN — Medication 40 MILLIEQUIVALENT(S): at 11:36

## 2020-07-24 RX ADMIN — GABAPENTIN 100 MILLIGRAM(S): 400 CAPSULE ORAL at 14:00

## 2020-07-24 RX ADMIN — Medication 1000 UNIT(S): at 11:37

## 2020-07-24 RX ADMIN — LACTULOSE 10 GRAM(S): 10 SOLUTION ORAL at 17:26

## 2020-07-24 RX ADMIN — ENOXAPARIN SODIUM 40 MILLIGRAM(S): 100 INJECTION SUBCUTANEOUS at 11:37

## 2020-07-24 RX ADMIN — LISINOPRIL 40 MILLIGRAM(S): 2.5 TABLET ORAL at 05:43

## 2020-07-24 RX ADMIN — Medication 88 MICROGRAM(S): at 05:43

## 2020-07-24 NOTE — PHYSICAL THERAPY INITIAL EVALUATION ADULT - STAIR PATTERN, REHAB EVAL
step to step/with minimal assist (patient normally gets assistance from family when doing the stairs)

## 2020-07-24 NOTE — PHYSICAL THERAPY INITIAL EVALUATION ADULT - CRITERIA FOR SKILLED THERAPEUTIC INTERVENTIONS
Statement Selected risk reduction/prevention/impairments found/functional limitations in following categories/rehab potential

## 2020-07-24 NOTE — DISCHARGE NOTE PROVIDER - PROVIDER TOKENS
PROVIDER:[TOKEN:[[id number]]],FREE:[LAST:[Your PMD],PHONE:[(   )    -],FAX:[(   )    -]],PROVIDER:[TOKEN:[[id number]]]

## 2020-07-24 NOTE — DISCHARGE NOTE PROVIDER - CARE PROVIDER_API CALL
Betty Krueger  NEUROLOGY  [address] [place] [address]  Phone: [phone]  Fax: [phone]  Follow Up Time:     Your PMD,   Phone: (   )    -  Fax: (   )    -  Follow Up Time:     Trina Simpson  ORTHOPAEDIC SURGERY  [address] [place] [address]  Phone: [phone]  Fax: [phone]  Follow Up Time:

## 2020-07-24 NOTE — DISCHARGE NOTE PROVIDER - NSDCMRMEDTOKEN_GEN_ALL_CORE_FT
amLODIPine 5 mg oral tablet: 1 tab(s) orally once a day  furosemide 40 mg oral tablet: 1 tab(s) orally once a day  levothyroxine 88 mcg (0.088 mg) oral tablet: 1 tab(s) orally once a day  lisinopril 40 mg oral tablet: 1 tab(s) orally once a day  Metoprolol Succinate  mg oral tablet, extended release: 1 tab(s) orally once a day amLODIPine 5 mg oral tablet: 1 tab(s) orally once a day  cholecalciferol oral tablet: 1000 unit(s) orally once a day  furosemide 40 mg oral tablet: 1 tab(s) orally once a day  gabapentin 100 mg oral capsule: 1 cap(s) orally once a day (at bedtime)  levothyroxine 88 mcg (0.088 mg) oral tablet: 1 tab(s) orally once a day  lisinopril 40 mg oral tablet: 1 tab(s) orally once a day  Metoprolol Succinate  mg oral tablet, extended release: 1 tab(s) orally once a day  traMADol 50 mg oral tablet: 0.5 tab(s) orally every 8 hours, As needed, Moderate Pain (4 - 6) MDD:2

## 2020-07-24 NOTE — PHYSICAL THERAPY INITIAL EVALUATION ADULT - PERTINENT HX OF CURRENT PROBLEM, REHAB EVAL
Chart reviewed and noted came to [facility] with daughters, sent in by PCP due to worsened LBP. Ortho consulted, MRI obtained: Acute compression fracture L3 superior endplate. LSO for pain management. initially. Now for comfort and can be given as an outpatient.

## 2020-07-24 NOTE — PROGRESS NOTE ADULT - ASSESSMENT
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 Recent outpt MRI showed herniated discs.    {[id number]}Chronic worsening low back pain with bilateral sciatica  - MRI showed an acute compression fracture L3 superior endplate resulting in loss of approximately 30% vertebral body height anteriorly, without retropulsion or canal stenosis  - as per ortho, patient should have LSO for comfort  - ortho recommended conservative management  - c/w gabapentin & PRN ultram  - PT eval once patient has LSO  {[id number]}  Hypothyroidism  - c/w synthroid    Pyuria  - UA showed mod epithelial cells and was likely contaminated   - doubt UTI, so stopped antibiotics     Essential hypertension  - c/w Toprol and lisinopril    Vitamin D deficiency  - Vit D, 25-OH level was 17.3  - start Vit D repletion    Hypokalemia  - resolved    Constipation  - c/w Senna and lactulose     Prophylaxis:  DVT: Lovenox  GI: Po diet    Discharge pending PT eval

## 2020-07-24 NOTE — DISCHARGE NOTE PROVIDER - NSDCCPCAREPLAN_GEN_ALL_CORE_FT
PRINCIPAL DISCHARGE DIAGNOSIS  Diagnosis: Acute bilateral low back pain with bilateral sciatica  Assessment and Plan of Treatment:

## 2020-07-24 NOTE — PHYSICAL THERAPY INITIAL EVALUATION ADULT - ADDITIONAL COMMENTS
(Per June 21 phonecall to daughter Basilia)  Patient lives c her family in 1st floor of private house c 3 stair steps to enter c bilateral handrails. Used a 2-wheeled rolling walker indoors and rollator outdoors. Also owns a commode. Had home health aide services 5 days a week. Denies falls in past 6 months or in recent past she can associate injury from. Patient only sees partially in one eye and is hard of hearing (wears hearing aides, which are currently at home.) Denies having had back braces in the past.

## 2020-07-24 NOTE — DISCHARGE NOTE PROVIDER - HOSPITAL COURSE
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 MRI showed an acute compression fracture L3 superior endplate resulting in loss of approximately 30% vertebral body height anteriorly, without retropulsion or canal stenosis. Ortho recommended, LSO for comfort and conservative management. Pt was given LSO, gabapentin & PRN ultram for pain. Of note, she was also found to have vitamin D deficiency w/ Vit D, 25-OH level of 17.3 and patient was started on Vit D repletion. She was also found to have hypokalemia. Her potassium was replaced and her daughter advised to talk to her PMD about stopping Lasix.         Discharge time: 43 minutes

## 2020-07-24 NOTE — PROGRESS NOTE ADULT - SUBJECTIVE AND OBJECTIVE BOX
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 Recent outpt MRI showed herniated discs. She is lying in bed in NAD.     MEDICATIONS  (STANDING):  cholecalciferol 1000 Unit(s) Oral daily  enoxaparin Injectable 40 milliGRAM(s) SubCutaneous daily  furosemide    Tablet 40 milliGRAM(s) Oral daily  gabapentin 100 milliGRAM(s) Oral three times a day  lactulose Syrup 10 Gram(s) Oral every 12 hours  levothyroxine 88 MICROGram(s) Oral daily  lisinopril 40 milliGRAM(s) Oral daily  metoprolol succinate  milliGRAM(s) Oral daily  senna 2 Tablet(s) Oral at bedtime  sodium chloride 2 Gram(s) Oral two times a day    MEDICATIONS  (PRN):  traMADol 25 milliGRAM(s) Oral every 8 hours PRN Moderate Pain (4 - 6)      Allergies    No Known Allergies    Intolerances        Vital Signs Last 24 Hrs  T(C): 37.1 (24 Jul 2020 16:40), Max: 37.1 (23 Jul 2020 23:15)  T(F): 98.7 (24 Jul 2020 16:40), Max: 98.7 (23 Jul 2020 23:15)  HR: 80 (24 Jul 2020 16:40) (59 - 80)  BP: 141/67 (24 Jul 2020 16:40) (134/66 - 141/68)  BP(mean): --  RR: 17 (24 Jul 2020 16:40) (17 - 19)  SpO2: 97% (24 Jul 2020 16:40) (94% - 97%)    PHYSICAL EXAM:  GENERAL: NAD, well-groomed, well-developed  HEAD:  Atraumatic, Normocephalic  EYES: EOMI, PERRLA  NECK: Supple   NERVOUS SYSTEM: Alert   CHEST/LUNG: Clear to auscultation bilaterally; No rales, rhonchi, wheezing, or rubs  HEART: Regular rate and rhythm; No murmurs, rubs, or gallops  ABDOMEN: Soft, Nontender, Nondistended; Bowel sounds present  EXTREMITIES: No clubbing, cyanosis, or edema    LABS:    07-24    134<L>  |  97  |  19  ----------------------------<  98  3.3<L>   |  32<H>  |  0.77    Ca    8.9      24 Jul 2020 07:24  Mg     2.8     07-24      Culture - Urine (collected 21 Jul 2020 14:14)  Source: .Urine Clean Catch (Midstream)  Final Report (22 Jul 2020 11:45):    <10,000 CFU/mL Normal Urogenital Annabella      RADIOLOGY & ADDITIONAL TESTS:    07-23-20 @ 07:01  -  07-24-20 @ 07:00  --------------------------------------------------------  IN:    Oral Fluid: 240 mL  Total IN: 240 mL    OUT:  Total OUT: 0 mL    Total NET: 240 mL      07-24-20 @ 07:01  -  07-24-20 @ 20:53  --------------------------------------------------------  IN:    Oral Fluid: 190 mL  Total IN: 190 mL    OUT:  Total OUT: 0 mL    Total NET: 190 mL

## 2020-07-25 ENCOUNTER — TRANSCRIPTION ENCOUNTER (OUTPATIENT)
Age: 85
End: 2020-07-25

## 2020-07-25 VITALS
HEART RATE: 68 BPM | SYSTOLIC BLOOD PRESSURE: 156 MMHG | TEMPERATURE: 98 F | OXYGEN SATURATION: 98 % | RESPIRATION RATE: 19 BRPM | DIASTOLIC BLOOD PRESSURE: 75 MMHG

## 2020-07-25 LAB
ANION GAP SERPL CALC-SCNC: 5 MMOL/L — SIGNIFICANT CHANGE UP (ref 5–17)
BUN SERPL-MCNC: 23 MG/DL — SIGNIFICANT CHANGE UP (ref 7–23)
CALCIUM SERPL-MCNC: 8.9 MG/DL — SIGNIFICANT CHANGE UP (ref 8.5–10.1)
CHLORIDE SERPL-SCNC: 101 MMOL/L — SIGNIFICANT CHANGE UP (ref 96–108)
CO2 SERPL-SCNC: 28 MMOL/L — SIGNIFICANT CHANGE UP (ref 22–31)
CREAT SERPL-MCNC: 0.72 MG/DL — SIGNIFICANT CHANGE UP (ref 0.5–1.3)
GLUCOSE SERPL-MCNC: 89 MG/DL — SIGNIFICANT CHANGE UP (ref 70–99)
MAGNESIUM SERPL-MCNC: 2.4 MG/DL — SIGNIFICANT CHANGE UP (ref 1.6–2.6)
POTASSIUM SERPL-MCNC: 4.2 MMOL/L — SIGNIFICANT CHANGE UP (ref 3.5–5.3)
POTASSIUM SERPL-SCNC: 4.2 MMOL/L — SIGNIFICANT CHANGE UP (ref 3.5–5.3)
SODIUM SERPL-SCNC: 134 MMOL/L — LOW (ref 135–145)

## 2020-07-25 PROCEDURE — 99239 HOSP IP/OBS DSCHRG MGMT >30: CPT

## 2020-07-25 RX ORDER — GABAPENTIN 400 MG/1
1 CAPSULE ORAL
Qty: 0 | Refills: 0 | DISCHARGE

## 2020-07-25 RX ORDER — CHOLECALCIFEROL (VITAMIN D3) 125 MCG
1000 CAPSULE ORAL
Qty: 30 | Refills: 0
Start: 2020-07-25 | End: 2020-08-23

## 2020-07-25 RX ORDER — TRAMADOL HYDROCHLORIDE 50 MG/1
0.5 TABLET ORAL
Qty: 6 | Refills: 0
Start: 2020-07-25 | End: 2020-07-28

## 2020-07-25 RX ADMIN — Medication 1000 UNIT(S): at 11:23

## 2020-07-25 RX ADMIN — GABAPENTIN 100 MILLIGRAM(S): 400 CAPSULE ORAL at 05:22

## 2020-07-25 RX ADMIN — Medication 88 MICROGRAM(S): at 05:22

## 2020-07-25 RX ADMIN — GABAPENTIN 100 MILLIGRAM(S): 400 CAPSULE ORAL at 13:02

## 2020-07-25 RX ADMIN — LACTULOSE 10 GRAM(S): 10 SOLUTION ORAL at 15:52

## 2020-07-25 RX ADMIN — SODIUM CHLORIDE 2 GRAM(S): 9 INJECTION INTRAMUSCULAR; INTRAVENOUS; SUBCUTANEOUS at 07:28

## 2020-07-25 RX ADMIN — Medication 40 MILLIGRAM(S): at 05:22

## 2020-07-25 RX ADMIN — ENOXAPARIN SODIUM 40 MILLIGRAM(S): 100 INJECTION SUBCUTANEOUS at 11:21

## 2020-07-25 RX ADMIN — LISINOPRIL 40 MILLIGRAM(S): 2.5 TABLET ORAL at 05:22

## 2020-07-25 NOTE — DISCHARGE NOTE NURSING/CASE MANAGEMENT/SOCIAL WORK - PATIENT PORTAL LINK FT
You can access the FollowMyHealth Patient Portal offered by [facility] by registering at the following website: [email or web address]. By joining [facility]’s FollowMyHealth portal, you will also be able to view your health information using other applications (apps) compatible with our system.

## 2020-08-04 DIAGNOSIS — M48.062 SPINAL STENOSIS, LUMBAR REGION WITH NEUROGENIC CLAUDICATION: ICD-10-CM

## 2020-08-04 DIAGNOSIS — E87.6 HYPOKALEMIA: ICD-10-CM

## 2020-08-04 DIAGNOSIS — M51.26 OTHER INTERVERTEBRAL DISC DISPLACEMENT, LUMBAR REGION: ICD-10-CM

## 2020-08-04 DIAGNOSIS — E55.9 VITAMIN D DEFICIENCY, UNSPECIFIED: ICD-10-CM

## 2020-08-04 DIAGNOSIS — K59.00 CONSTIPATION, UNSPECIFIED: ICD-10-CM

## 2020-08-04 DIAGNOSIS — G89.29 OTHER CHRONIC PAIN: ICD-10-CM

## 2020-08-04 DIAGNOSIS — M48.56XA COLLAPSED VERTEBRA, NOT ELSEWHERE CLASSIFIED, LUMBAR REGION, INITIAL ENCOUNTER FOR FRACTURE: ICD-10-CM

## 2020-08-04 DIAGNOSIS — E03.9 HYPOTHYROIDISM, UNSPECIFIED: ICD-10-CM

## 2020-08-04 DIAGNOSIS — I35.8 OTHER NONRHEUMATIC AORTIC VALVE DISORDERS: ICD-10-CM

## 2020-08-04 DIAGNOSIS — H54.413A BLINDNESS RIGHT EYE CATEGORY 3, NORMAL VISION LEFT EYE: ICD-10-CM

## 2020-08-04 DIAGNOSIS — I10 ESSENTIAL (PRIMARY) HYPERTENSION: ICD-10-CM

## 2020-08-04 DIAGNOSIS — I27.20 PULMONARY HYPERTENSION, UNSPECIFIED: ICD-10-CM

## 2020-08-04 DIAGNOSIS — M54.32 SCIATICA, LEFT SIDE: ICD-10-CM

## 2020-08-04 DIAGNOSIS — R82.81 PYURIA: ICD-10-CM

## 2020-08-04 DIAGNOSIS — M54.31 SCIATICA, RIGHT SIDE: ICD-10-CM

## 2020-11-25 ENCOUNTER — APPOINTMENT (OUTPATIENT)
Dept: PULMONOLOGY | Facility: CLINIC | Age: 85
End: 2020-11-25
Payer: MEDICARE

## 2020-11-25 VITALS
OXYGEN SATURATION: 96 % | RESPIRATION RATE: 17 BRPM | TEMPERATURE: 97.6 F | HEIGHT: 60 IN | DIASTOLIC BLOOD PRESSURE: 76 MMHG | BODY MASS INDEX: 25.52 KG/M2 | SYSTOLIC BLOOD PRESSURE: 136 MMHG | HEART RATE: 66 BPM | WEIGHT: 130 LBS

## 2020-11-25 DIAGNOSIS — R91.8 OTHER NONSPECIFIC ABNORMAL FINDING OF LUNG FIELD: ICD-10-CM

## 2020-11-25 PROCEDURE — 99214 OFFICE O/P EST MOD 30 MIN: CPT

## 2020-11-25 RX ORDER — RIOCIGUAT 0.5 MG/1
0.5 TABLET, FILM COATED ORAL
Qty: 3 | Refills: 3 | Status: ACTIVE | COMMUNITY
Start: 2018-07-25 | End: 1900-01-01

## 2020-11-25 NOTE — ASSESSMENT
[FreeTextEntry1] : Ms. Trejo is a 89 year old female with a history of Macular Degeneration, PAH, arthritis, gerd, HTN, asthma who is stable from a pulmonary perspective. She presents into the office for a pulmonary follow up.#1 issue is arthritis. \par \par Her shortness of breath is multifactorial due to:\par -overweight\par -PAH\par -poor breathing mechanics related to balance\par -? 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 Majano, needs repeat if not done from 3/2016\par -tolerating it well\par -regular LFTs \par -to maintain a low salt diet \par \par -Disorder of the pulmonary arteries, important to distinguish the difference between pulmonary arterial hypertension which is idiopathic to secondary pulmonary hypertension which is related to heart disease being diastolic dysfunction or congestive heart failure. Diagnostics include an initial echocardiogram evaluating the pulmonary artery pressures, if this is abnormal, to proceed with a VQ scan as well as a CTPA and an eventual right heart catheterization (No medication can be prescribed until the right heart catheterization). If present, the evaluation will include rheumatological blood testing, HIV testing, and potential evaluation for cirrhosis. Drug classes include PED5 (Revatio, Adcirca) ETRA (Tracleer, Macitentan, Letairis), Soluble guanylate cyclase (Adempas) Prostacyclins (Uptravi, Tyavso, Ventavis, Remodulin, or Orenitram derivatives).\par \par problem 2: knee pain and arthritis \par -recommended Copper Knee brace / Arnica Cream / Topricin Cream / P3 cream \par -recommended orthopedic follow up \par -recommended to follow up with Dr. Luis Julien\par \par problem 3: GERD\par  -continue Pepcid 40 mg QHS \par -Rule of 2s: avoid eating too much, eating too late, eating too spicy, eating two hours before bed\par -Things to avoid including overeating, spicy foods, tight clothing, eating within three hours of bed, this list is not all inclusive. \par -For treatment of reflux, possible options discussed including diet control, H2 blockers, PPIs, as well as coating motility agents discussed as treatment options. Timing of meals and proximity of last meal to sleep were discussed. If symptoms persist, a formal gastrointestinal evaluation is needed. \par \par problem 4: ? ILDz\par -she is being sent for a high resolution chest CT with inspiratory and expiratory phases, noncompliant prior (prescription given)( multiple times)\par \par -Etiology will depend on the causative agent possibilities include: idiopathic pulmonary fibrosis (UIP), NSIP (nonspecific interstitial pneumonia) , respiratory bronchiolitis in someone who is a smoker, drug induced lung disease, hypersensitivity pneumonitis, BOOP, sarcoidosis, chronic congestive heart failure,  rheumatologic disorder induced interstitial lung disease. Optimal diagnosing will include rheumatological panel which would include ESR, FARNAZ, ANCA, ARNP, CCP, rheumatoid factor, hypersensitivity panel, JOE1, scleroderma antibodies, sjogren's syndrome antibodies; biopsy will be necessary to definitively determine the etiology unless the CT scan findings are specific for UIP. Therapy will be based on diagnostics. \par \par problem 5: asthma\par -she was offered a bronchodilator but at this point in time the patient has refused\par \par -Asthma is  believed to be caused by inherited (genetic) and environmental factor, but its exact cause is unknown. Asthma may be triggered by allergens, lung infections, or irritants in the air. Asthma triggers are different for each person\par \par problem 6: Health Maintenance/COVID19 Precautions:\par - Clean your hands often. Wash your hands often with soap and water for at least 20 seconds, especially after blowing your nose, coughing, or sneezing, or having been in a public place.\par - If soap and water are not available, use a hand  that contains at least 60% alcohol.\par - To the extent possible, avoid touching high-touch surfaces in public places - elevator buttons, door handles, handrails, handshaking with people, etc. Use a tissue or your sleeve to cover your hand or finger if you must touch something.\par - Wash your hands after touching surfaces in public places.\par - Avoid touching your face, nose, eyes, etc.\par - Clean and disinfect your home to remove germs: practice routine cleaning of frequently touched surfaces (for example: tables, doorknobs, light switches, handles, desks, toilets, faucets, sinks & cell phones)\par - Avoid crowds, especially in poorly ventilated spaces. Your risk of exposure to respiratory viruses like COVID-19 may increase in crowded, closed-in settings with little air circulation if there are people in the crowd who are sick. All patients are recommended to practice social distancing and stay at least 6 feet away from others.\par - Avoid all non-essential travel including plane trips, and especially avoid embarking on cruise ships.\par -If COVID-19 is spreading in your community, take extra measures to put distance between yourself and other people to further reduce your risk of being exposed to this new virus.\par -Stay home as much as possible.\par - Consider ways of getting food brought to your house through family, social, or commercial networks\par -Be aware that the virus has been known to live in the air up to 3 hours post exposure, cardboard up to 24 hours post exposure, copper up to 4 hours post exposure, steel and plastic up to 2-3 days post exposure. Risk of transmission from these surfaces are affected by many variables.\par Immune Support Recommendations:\par -OTC Vitamin C 500mg BID \par -OTC Quercetin 250-500mg BID \par -OTC Zinc 75-100mg per day \par -OTC Melatonin 1 or 2 mg a night \par -OTC Vitamin D 1-4000mg per day \par -OTC Tonic Water 8oz per day\par Asthma and COVID19:\par You need to make sure your asthma is under control. This often requires the use of inhaled corticosteroids (and sometimes oral corticosteroids). Inhaled corticosteroids do not likely reduce your immune system’s ability to fight infections, but oral corticosteroids may. It is important to use the steps above to protect yourself to limit your exposure to any respiratory virus. \par \par problem 7: health maintenance \par -s/p flu shot 2020 \par -recommended strep pneumonia vaccines: Prevnar-13 vaccine, followed by Pneumo vaccine 23 one year following (completed)\par -recommended early intervention for URIs\par -recommended regular osteoporosis evaluations\par -recommended early dermatological evaluations\par -recommended after the age of 50 to the age of 70, colonoscopy every 5 years \par \par F/U in 4 months\par She is encouraged to call with any changes, concerns, or questions.

## 2020-11-25 NOTE — HISTORY OF PRESENT ILLNESS
[FreeTextEntry1] : Ms. Trejo is a 89 year old female with a history of asthma, ILDz, lung field abnormal finding, PAH and SOB who presents  to the office today for a follow up visit. Her chief complaint is\par - she has been fine in general\par - she has been losing some weight \par - her balance has not been so good\par - her ankles get swollen when she does not take her medications regularly. \par - she notes her appetite has lessened. \par - she notes she has not been much walking but she would like to. \par - she notes she has arthritis pain that bothers her. \par - her number one issue is her arthritis / knee \par - no coughing / wheezing \par - She  denies any visual issues, headaches, nausea, vomiting, fever, chills, sweats, chest pains, chest pressure, diarrhea, constipation, dysphagia, myalgia, dizziness, leg swelling, leg pain, itchy eyes, itchy ears, heartburn, reflux, or sour taste in the mouth.

## 2020-11-25 NOTE — PHYSICAL EXAM
[No Acute Distress] : no acute distress [Well Nourished] : well nourished [No Deformities] : no deformities [Well Developed] : well developed [Normal Oropharynx] : normal oropharynx [Normal Appearance] : normal appearance [No Neck Mass] : no neck mass [Normal Rate/Rhythm] : normal rate/rhythm [Normal S1, S2] : normal s1, s2 [No Murmurs] : no murmurs [No Resp Distress] : no resp distress [Clear to Auscultation Bilaterally] : clear to auscultation bilaterally [No Abnormalities] : no abnormalities [Benign] : benign [Normal Gait] : normal gait [No Clubbing] : no clubbing [No Cyanosis] : no cyanosis [No Edema] : no edema [FROM] : FROM [Normal Color/ Pigmentation] : normal color/ pigmentation [No Focal Deficits] : no focal deficits [Oriented x3] : oriented x3 [Normal Affect] : normal affect [III] : Mallampati Class: III [TextBox_54] : 2/6 sys murmur  [TextBox_68] : I:E 1:3, mild crackles at bases  [TextBox_105] : 1+ EDEMA

## 2020-11-25 NOTE — ADDENDUM
[FreeTextEntry1] : Documented by Nel Pandey acting as a scribe for Dr. Gordon Osei on 11/25/2020 \par \par All medical record entries made by the Scribe were at my, Dr. Gordon Osei's, direction and personally dictated by me on 11/25/2020 . I have reviewed the chart and agree that the record accurately reflects my personal performance of the history, physical exam, assessment and plan. I have also personally directed, reviewed, and agree with the discharge instructions.

## 2020-11-25 NOTE — REASON FOR VISIT
[Follow-Up] : a follow-up visit [Family Member] : family member [FreeTextEntry1] : asthma, ILDz, lung field abnormal finding, PAH and SOB

## 2021-06-07 DIAGNOSIS — Z01.812 ENCOUNTER FOR PREPROCEDURAL LABORATORY EXAMINATION: ICD-10-CM

## 2021-06-21 ENCOUNTER — APPOINTMENT (OUTPATIENT)
Dept: PULMONOLOGY | Facility: CLINIC | Age: 86
End: 2021-06-21
Payer: MEDICARE

## 2021-06-21 VITALS
HEIGHT: 60 IN | OXYGEN SATURATION: 98 % | DIASTOLIC BLOOD PRESSURE: 80 MMHG | TEMPERATURE: 98 F | HEART RATE: 70 BPM | WEIGHT: 128 LBS | RESPIRATION RATE: 17 BRPM | BODY MASS INDEX: 25.13 KG/M2 | SYSTOLIC BLOOD PRESSURE: 143 MMHG

## 2021-06-21 DIAGNOSIS — R06.02 SHORTNESS OF BREATH: ICD-10-CM

## 2021-06-21 DIAGNOSIS — I27.21 SECONDARY PULMONARY ARTERIAL HYPERTENSION: ICD-10-CM

## 2021-06-21 DIAGNOSIS — Z71.89 OTHER SPECIFIED COUNSELING: ICD-10-CM

## 2021-06-21 DIAGNOSIS — J45.909 UNSPECIFIED ASTHMA, UNCOMPLICATED: ICD-10-CM

## 2021-06-21 DIAGNOSIS — E66.9 OBESITY, UNSPECIFIED: ICD-10-CM

## 2021-06-21 DIAGNOSIS — J84.9 INTERSTITIAL PULMONARY DISEASE, UNSPECIFIED: ICD-10-CM

## 2021-06-21 PROCEDURE — 94729 DIFFUSING CAPACITY: CPT

## 2021-06-21 PROCEDURE — ZZZZZ: CPT

## 2021-06-21 PROCEDURE — 99214 OFFICE O/P EST MOD 30 MIN: CPT | Mod: 25

## 2021-06-21 PROCEDURE — 94010 BREATHING CAPACITY TEST: CPT

## 2021-06-21 RX ORDER — DICLOFENAC SODIUM AND MISOPROSTOL 50; 200 MG/1; UG/1
50-0.2 TABLET, DELAYED RELEASE ORAL
Qty: 60 | Refills: 2 | Status: ACTIVE | COMMUNITY
Start: 2021-06-21 | End: 1900-01-01

## 2021-06-21 NOTE — PHYSICAL EXAM
[No Acute Distress] : no acute distress [Well Nourished] : well nourished [No Deformities] : no deformities [Well Developed] : well developed [Normal Oropharynx] : normal oropharynx [III] : Mallampati Class: III [Normal Appearance] : normal appearance [No Neck Mass] : no neck mass [Normal Rate/Rhythm] : normal rate/rhythm [Normal S1, S2] : normal s1, s2 [No Murmurs] : no murmurs [No Resp Distress] : no resp distress [Clear to Auscultation Bilaterally] : clear to auscultation bilaterally [Kyphosis] : kyphosis [Benign] : benign [Normal Gait] : normal gait [No Clubbing] : no clubbing [No Cyanosis] : no cyanosis [FROM] : FROM [1+ Pitting] : 1+ pitting [Normal Color/ Pigmentation] : normal color/ pigmentation [No Focal Deficits] : no focal deficits [Oriented x3] : oriented x3 [Normal Affect] : normal affect [TextBox_54] : 2/6 sys murmur  [TextBox_68] : I:E 1:3, mild inspiratory crackles at bases  [TextBox_105] : 1+ lower extremity edema to mild calf

## 2021-06-21 NOTE — HISTORY OF PRESENT ILLNESS
[FreeTextEntry1] : Ms. Trejo is a 89 year old female with a history of asthma, ILDz, lung field abnormal finding, PAH and SOB who presents  to the office today for a follow up visit. Her chief complaint is right knee arthralgias\par \par -she notes generally feeling well\par -she notes right knee arthralgias with residual lower extremity swelling\par -she notes regular bowel movements \par -she notes appetite decreased from baseline but stable\par -she notes weight mildly decreased\par -she denies SOB\par -she denies cough\par -she denies wheeze\par -she notes sleep quality fluctuates, alleviated by afternoon nap\par -she notes sinuses quiet\par -she notes exercises walking \par -she denies blood thinner Rx\par \par -denies any chest pain, chest pressure, diarrhea, constipation, dysphagia, sour taste in the mouth, dizziness, leg pain, myalgias, itchy eyes, itchy ears, heartburn, or reflux.\par \par

## 2021-06-21 NOTE — ASSESSMENT
[FreeTextEntry1] : Ms. Trejo is a 89 year old female with a history of Macular Degeneration, PAH, arthritis, gerd, HTN, asthma who is stable from a pulmonary perspective. She presents into the office for a pulmonary follow up.#1 issue is arthritis. (knee) \par \par Her shortness of breath is multifactorial due to:\par -overweight\par -PAH\par -poor breathing mechanics related to balance\par -? 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 Majano, needs repeat if not done from 3/2016\par -tolerating it well\par -regular LFTs \par -to maintain a low salt diet \par \par -Disorder of the pulmonary arteries, important to distinguish the difference between pulmonary arterial hypertension which is idiopathic to secondary pulmonary hypertension which is related to heart disease being diastolic dysfunction or congestive heart failure. Diagnostics include an initial echocardiogram evaluating the pulmonary artery pressures, if this is abnormal, to proceed with a VQ scan as well as a CTPA and an eventual right heart catheterization (No medication can be prescribed until the right heart catheterization). If present, the evaluation will include rheumatological blood testing, HIV testing, and potential evaluation for cirrhosis. Drug classes include PED5 (Revatio, Adcirca) ETRA (Tracleer, Macitentan, Letairis), Soluble guanylate cyclase (Adempas) Prostacyclins (Uptravi, Tyavso, Ventavis, Remodulin, or Orenitram derivatives).\par \par problem 2: knee pain and arthritis \par -recommended Copper Knee brace / Arnica Cream / Topricin Cream / P3 cream \par -recommended Arthotec 50 mg BID\par -recommended orthopedic follow up \par -recommended to follow up with Dr. Luis Julien\par \par problem 3: GERD\par  -continue Pepcid 40 mg QHS \par -Rule of 2s: avoid eating too much, eating too late, eating too spicy, eating two hours before bed\par -Things to avoid including overeating, spicy foods, tight clothing, eating within three hours of bed, this list is not all inclusive. \par -For treatment of reflux, possible options discussed including diet control, H2 blockers, PPIs, as well as coating motility agents discussed as treatment options. Timing of meals and proximity of last meal to sleep were discussed. If symptoms persist, a formal gastrointestinal evaluation is needed. \par \par problem 4: ? ILDz\par -she is being sent for a high resolution chest CT with inspiratory and expiratory phases, noncompliant prior (prescription given)( multiple times) (HRCT)\par \par -Etiology will depend on the causative agent possibilities include: idiopathic pulmonary fibrosis (UIP), NSIP (nonspecific interstitial pneumonia) , respiratory bronchiolitis in someone who is a smoker, drug induced lung disease, hypersensitivity pneumonitis, BOOP, sarcoidosis, chronic congestive heart failure,  rheumatologic disorder induced interstitial lung disease. Optimal diagnosing will include rheumatological panel which would include ESR, FARNAZ, ANCA, ARNP, CCP, rheumatoid factor, hypersensitivity panel, JOE1, scleroderma antibodies, sjogren's syndrome antibodies; biopsy will be necessary to definitively determine the etiology unless the CT scan findings are specific for UIP. Therapy will be based on diagnostics. \par \par problem 5: asthma\par -she was offered a bronchodilator but at this point in time the patient has refused\par \par -Asthma is  believed to be caused by inherited (genetic) and environmental factor, but its exact cause is unknown. Asthma may be triggered by allergens, lung infections, or irritants in the air. Asthma triggers are different for each person\par \par problem 6: Health Maintenance/COVID19 Precautions:\par -s/p Pfizer COVID 19 vaccine x 2\par - Clean your hands often. Wash your hands often with soap and water for at least 20 seconds, especially after blowing your nose, coughing, or sneezing, or having been in a public place.\par - If soap and water are not available, use a hand  that contains at least 60% alcohol.\par - To the extent possible, avoid touching high-touch surfaces in public places - elevator buttons, door handles, handrails, handshaking with people, etc. Use a tissue or your sleeve to cover your hand or finger if you must touch something.\par - Wash your hands after touching surfaces in public places.\par - Avoid touching your face, nose, eyes, etc.\par - Clean and disinfect your home to remove germs: practice routine cleaning of frequently touched surfaces (for example: tables, doorknobs, light switches, handles, desks, toilets, faucets, sinks & cell phones)\par - Avoid crowds, especially in poorly ventilated spaces. Your risk of exposure to respiratory viruses like COVID-19 may increase in crowded, closed-in settings with little air circulation if there are people in the crowd who are sick. All patients are recommended to practice social distancing and stay at least 6 feet away from others.\par - Avoid all non-essential travel including plane trips, and especially avoid embarking on cruise ships.\par -If COVID-19 is spreading in your community, take extra measures to put distance between yourself and other people to further reduce your risk of being exposed to this new virus.\par -Stay home as much as possible.\par - Consider ways of getting food brought to your house through family, social, or commercial networks\par -Be aware that the virus has been known to live in the air up to 3 hours post exposure, cardboard up to 24 hours post exposure, copper up to 4 hours post exposure, steel and plastic up to 2-3 days post exposure. Risk of transmission from these surfaces are affected by many variables.\par Immune Support Recommendations:\par -OTC Vitamin C 500mg BID \par -OTC Quercetin 250-500mg BID \par -OTC Zinc 75-100mg per day \par -OTC Melatonin 1 or 2 mg a night \par -OTC Vitamin D 1-4000mg per day \par -OTC Tonic Water 8oz per day\par Asthma and COVID19:\par You need to make sure your asthma is under control. This often requires the use of inhaled corticosteroids (and sometimes oral corticosteroids). Inhaled corticosteroids do not likely reduce your immune system’s ability to fight infections, but oral corticosteroids may. It is important to use the steps above to protect yourself to limit your exposure to any respiratory virus. \par \par problem 7: health maintenance \par -s/p flu shot 2020 \par -recommended strep pneumonia vaccines: Prevnar-13 vaccine, followed by Pneumo vaccine 23 one year following (completed)\par -recommended early intervention for URIs\par -recommended regular osteoporosis evaluations\par -recommended early dermatological evaluations\par -recommended after the age of 50 to the age of 70, colonoscopy every 5 years \par \par F/U in 4 months\par She is encouraged to call with any changes, concerns, or questions.

## 2021-06-21 NOTE — ADDENDUM
[FreeTextEntry1] : Documented by Jeff Bro acting as a scribe for Dr. Gordon Osei on 06/21/2021.\par \par All medical record entries made by the Scribe were at my, Dr. Gordon Osei's, direction and personally dictated by me on 06/21/2021 . I have reviewed the chart and agree that the record accurately reflects my personal performance of the history, physical exam, assessment and plan. I have also personally directed, reviewed, and agree with the discharge instructions. \par

## 2021-06-21 NOTE — PROCEDURE
[FreeTextEntry1] : Full PFT revealed moderate restrictive dysfunction, with a FEV1 of 0.92 L, which is 51% of predicted, normal lung volumes, and a severe reduced diffusion of 6.1, which is 37% of predicted, with a normal flow volume loop.\par  \par

## 2021-07-27 NOTE — PATIENT PROFILE ADULT - BRADEN MOISTURE
Detail Level: Simple
Include Location In Plan?: Yes
Hide Include Location In Plan Question?: No
Detail Level: Detailed
(4) rarely moist

## 2021-10-21 ENCOUNTER — APPOINTMENT (OUTPATIENT)
Dept: PULMONOLOGY | Facility: CLINIC | Age: 86
End: 2021-10-21

## 2021-12-09 RX ORDER — RIOCIGUAT 0.5 MG/1
0.5 TABLET, FILM COATED ORAL
Qty: 90 | Refills: 0 | Status: ACTIVE | COMMUNITY
Start: 2017-05-26 | End: 1900-01-01

## 2021-12-21 ENCOUNTER — APPOINTMENT (OUTPATIENT)
Dept: PULMONOLOGY | Facility: CLINIC | Age: 86
End: 2021-12-21

## 2022-02-21 NOTE — ED ADULT TRIAGE NOTE - WEIGHT METHOD
Weight Management Follow-up     Subjective:  Farhan Abarca is a 36 year old female following up for obesity management and comorbidities. She has hypertension, anxiety and class 1 obesity.  This is her third visit. Her initail weight was 205 with a BMI 33.  Her weight today is 191.6.  She is doing well with her meal plan. She could use more vegetables. Water is up to 144 ounces.  I recommend that she not increase that at all. She is doing a great exercise program with 30 min cardio daily and 15 minutes of resistance 2 days a week. She was doing the resistance training 3-4 times a week, but found that was too much. Her clothes are fitting better.  Since going off the amitriptyline she is waking up at 3 am and not able to go back to bed. She will try to take the melatonin earlier, but if that does not help she will go back to 5 mg of amitriptyline.  She denies any chest pain, shortness of breath, abdominal pain, edema. No new concerns other than the sleep issue.   Problem list, medications and labs were reviewed.    BMI:  BMI Readings from Last 4 Encounters:   12/23/21 33.15 kg/m²   12/17/21 33.09 kg/m²   07/26/21 33.12 kg/m²   04/15/21 32.05 kg/m²       Blood Pressure / Pulse:  BP Readings from Last 4 Encounters:   12/23/21 132/80   12/17/21 122/80   07/26/21 (!) 150/100   04/15/21 (!) 142/82      Pulse Readings from Last 4 Encounters:   12/23/21 86   12/17/21 100   07/26/21 99   12/28/20 89        Weight:  Wt Readings from Last 4 Encounters:   12/23/21 91.8 kg (202 lb 4.8 oz)   12/17/21 93 kg (205 lb)   07/26/21 93.1 kg (205 lb 3.2 oz)   04/15/21 90.1 kg (198 lb 9.6 oz)       1. HTN (hypertension), benign    2. Obesity (BMI 30-39.9)    3. Anxiety       Social History     Tobacco Use   • Smoking status: Never Smoker   • Smokeless tobacco: Never Used   Vaping Use   • Vaping Use: never used   Substance Use Topics   • Alcohol use: Yes     Comment: less than one per week   • Drug use: Never     No outpatient  medications have been marked as taking for the 2/24/22 encounter (Office Visit) with Milly Paulino DO.        History Reviewed:  I have reviewed Problem list, Medical History, Surgical History, Family History, Social History, Allergies and Medications listed in her medical record.      Review of Systems:  Constitutional: Negative for fever and chills.   Skin: Negative for rash.   Respiratory: Negative cough, wheezing or shortness of breath.    Cardiovascular: Negative for chest pain, chest pressure or palpitations.   Gastrointestinal: Negative for nausea, vomiting, diarrhea.   Genitourinary: Negative for dysuria, urgency, frequency or hematuria.  Extremities:  Negative for joint swelling or joint pain.  Neurologic:  Negative for change in sensory or motor function.   Endocrine: Negative for heat or cold intolerance.  Psychiatric: Negative for change in mood or mentation  All other systems are reviewed and are negative except as documented in the history of present illness.     Depression Screening:  Recent Review Flowsheet Data     Date 2/24/2022    Adult PHQ 2 Score 0    Adult PHQ 2 Interpretation No further screening needed    Little interest or pleasure in activity? Not at all    Feeling down, depressed or hopeless? Not at all          Physical Exam:  There were no vitals filed for this visit.   General: Alert, in no acute distress  Skin: Warm and dry without rash  Respiratory: Normal respiratory effort.   Lower Extremities: without edema  Psychiatric: Cooperative. Appropriate mood and affect.    Labs:  Lab Results   Component Value Date    SODIUM 138 08/17/2021    POTASSIUM 3.9 08/17/2021    CHLORIDE 100 08/17/2021    CO2 28 08/17/2021    BUN 9 08/17/2021    CREATININE 0.57 08/17/2021    GLUCOSE 85 08/17/2021     Lab Results   Component Value Date    WBC 10.5 05/06/2020    HCT 38.1 05/06/2020    HGB 12.9 05/06/2020     05/06/2020     Hemoglobin A1C (%)   Date Value   08/17/2021 5.2     TSH (mcUnits/mL)    Date Value   08/17/2021 1.506     Lab Results   Component Value Date    CHOLESTEROL 171 08/17/2021    HDL 46 (L) 08/17/2021    CALCLDL 103 08/17/2021    TRIGLYCERIDE 111 08/17/2021     Lab Results   Component Value Date    AST 15 08/17/2021    GPT 30 08/17/2021    ALKPT 79 08/17/2021    BILIRUBIN 0.5 08/17/2021       Assessment/Plan:  Problem List Items Addressed This Visit     HTN (hypertension), benign - Primary     Monitor blood pressure.  Monitor kidney function.         Anxiety     Monitor mood         Obesity (BMI 30-39.9)     Monitor bmi  Call [phone] for follow up appointment in 2 months.    Increase vegetables  Continue with great meal plan and exercise program.  Will try melatonin earlier. If still having difficulty with sleep ok to take half (5mg) amitriptyline.  Consider increasing phentermine/topiramate if wt. Loss stalls.                 Lost 5.6% of presenting weight.  Remains motivated to continue weight management efforts for obesity and associated comorbidities assessed today.   Continue lifestyle changes.     Follow up:   Return in about 2 months (around 4/24/2022).     This visit is being performed virtually via Video visit using [employer].   Clinical Location: Home  [facility]'s location Home and is physically present in   the [facility] at the time of this visit.              stated

## 2022-07-12 NOTE — ED ADULT TRIAGE NOTE - PAIN: PRESENCE, MLM
complains of pain/discomfort Opioid Counseling: I discussed with the patient the potential side effects of opioids including but not limited to addiction, altered mental status, and depression. I stressed avoiding alcohol, benzodiazepines, muscle relaxants and sleep aids unless specifically okayed by a physician. The patient verbalized understanding of the proper use and possible adverse effects of opioids. All of the patient's questions and concerns were addressed. They were instructed to flush the remaining pills down the toilet if they did not need them for pain.

## 2023-01-31 NOTE — H&P ADULT - PROBLEM SELECTOR PLAN 1
Yes
intractable pain, unable to ambulate, pain control, gabapentin, LSO brace, seen by orthopedics, steroids for few days, but low suspicion cauda equina, PT

## 2023-07-25 NOTE — ED PROVIDER NOTE - PMH
Aortic sclerosis    Blindness of right eye    Cataract    HTN (hypertension)    Hypothyroidism    Pulmonary hypertension    Swelling of right lower extremity  chronic right lower extremity swelling as per daughter 89
